# Patient Record
Sex: FEMALE | Race: WHITE | Employment: OTHER | URBAN - NONMETROPOLITAN AREA
[De-identification: names, ages, dates, MRNs, and addresses within clinical notes are randomized per-mention and may not be internally consistent; named-entity substitution may affect disease eponyms.]

---

## 2017-08-11 ENCOUNTER — HOSPITAL ENCOUNTER (EMERGENCY)
Age: 27
Discharge: HOME | End: 2017-08-11
Attending: EMERGENCY MEDICINE
Payer: COMMERCIAL

## 2017-08-11 VITALS
SYSTOLIC BLOOD PRESSURE: 119 MMHG | WEIGHT: 144 LBS | BODY MASS INDEX: 24.59 KG/M2 | HEART RATE: 84 BPM | OXYGEN SATURATION: 100 % | TEMPERATURE: 97.8 F | HEIGHT: 64 IN | RESPIRATION RATE: 16 BRPM | DIASTOLIC BLOOD PRESSURE: 69 MMHG

## 2017-08-11 DIAGNOSIS — R55 VASOVAGAL SYNCOPE: Primary | ICD-10-CM

## 2017-08-11 LAB
-: ABNORMAL
ABSOLUTE EOS #: 0.1 K/UL (ref 0–0.4)
ABSOLUTE LYMPH #: 1.5 K/UL (ref 1–4.8)
ABSOLUTE MONO #: 0.7 K/UL (ref 0–1)
ALBUMIN SERPL-MCNC: 4.4 G/DL (ref 3.5–5.2)
ALBUMIN/GLOBULIN RATIO: 1.7 (ref 1–2.5)
ALP BLD-CCNC: 77 U/L (ref 35–104)
ALT SERPL-CCNC: 17 U/L (ref 5–33)
AMORPHOUS: ABNORMAL
AMPHETAMINE SCREEN URINE: NEGATIVE
ANION GAP SERPL CALCULATED.3IONS-SCNC: 13 MMOL/L (ref 9–17)
AST SERPL-CCNC: 21 U/L
BACTERIA: ABNORMAL
BARBITURATE SCREEN URINE: NEGATIVE
BASOPHILS # BLD: 0 %
BASOPHILS ABSOLUTE: 0 K/UL (ref 0–0.2)
BENZODIAZEPINE SCREEN, URINE: NEGATIVE
BILIRUB SERPL-MCNC: 0.44 MG/DL (ref 0.3–1.2)
BILIRUBIN URINE: NEGATIVE
BUN BLDV-MCNC: 10 MG/DL (ref 6–20)
BUN/CREAT BLD: 14 (ref 9–20)
BUPRENORPHINE URINE: NEGATIVE
CALCIUM SERPL-MCNC: 9.2 MG/DL (ref 8.6–10.4)
CANNABINOID SCREEN URINE: NEGATIVE
CASTS UA: ABNORMAL /LPF
CHLORIDE BLD-SCNC: 102 MMOL/L (ref 98–107)
CO2: 25 MMOL/L (ref 20–31)
COCAINE METABOLITE, URINE: NEGATIVE
COLOR: YELLOW
COMMENT UA: ABNORMAL
CREAT SERPL-MCNC: 0.73 MG/DL (ref 0.5–0.9)
CRYSTALS, UA: ABNORMAL /HPF
DIFFERENTIAL TYPE: NORMAL
EOSINOPHILS RELATIVE PERCENT: 1 %
EPITHELIAL CELLS UA: ABNORMAL /HPF (ref 0–25)
GFR AFRICAN AMERICAN: >60 ML/MIN
GFR NON-AFRICAN AMERICAN: >60 ML/MIN
GFR SERPL CREATININE-BSD FRML MDRD: ABNORMAL ML/MIN/{1.73_M2}
GFR SERPL CREATININE-BSD FRML MDRD: ABNORMAL ML/MIN/{1.73_M2}
GLUCOSE BLD-MCNC: 121 MG/DL (ref 70–99)
GLUCOSE URINE: NEGATIVE
HCG QUALITATIVE: NEGATIVE
HCT VFR BLD CALC: 39.5 % (ref 36–46)
HEMOGLOBIN: 13.6 G/DL (ref 12–16)
KETONES, URINE: NEGATIVE
LEUKOCYTE ESTERASE, URINE: ABNORMAL
LYMPHOCYTES # BLD: 18 %
MCH RBC QN AUTO: 30.6 PG (ref 26–34)
MCHC RBC AUTO-ENTMCNC: 34.5 G/DL (ref 31–37)
MCV RBC AUTO: 88.7 FL (ref 80–100)
MDMA URINE: NORMAL
METHADONE SCREEN, URINE: NEGATIVE
METHAMPHETAMINE, URINE: NEGATIVE
MONOCYTES # BLD: 8 %
MUCUS: ABNORMAL
NITRITE, URINE: NEGATIVE
OPIATES, URINE: NEGATIVE
OTHER OBSERVATIONS UA: ABNORMAL
OXYCODONE SCREEN URINE: NEGATIVE
PDW BLD-RTO: 13.4 % (ref 12.1–15.2)
PH UA: 8 (ref 5–9)
PHENCYCLIDINE, URINE: NEGATIVE
PLATELET # BLD: 163 K/UL (ref 140–450)
PLATELET ESTIMATE: NORMAL
PMV BLD AUTO: 9.1 FL (ref 6–12)
POTASSIUM SERPL-SCNC: 3.6 MMOL/L (ref 3.7–5.3)
PROPOXYPHENE, URINE: NEGATIVE
PROTEIN UA: NEGATIVE
RBC # BLD: 4.45 M/UL (ref 4–5.2)
RBC # BLD: NORMAL 10*6/UL
RBC UA: ABNORMAL /HPF (ref 0–2)
RENAL EPITHELIAL, UA: ABNORMAL /HPF
SEG NEUTROPHILS: 73 %
SEGMENTED NEUTROPHILS ABSOLUTE COUNT: 6 K/UL (ref 1.8–7.7)
SODIUM BLD-SCNC: 140 MMOL/L (ref 135–144)
SPECIFIC GRAVITY UA: 1.01 (ref 1.01–1.02)
TEST INFORMATION: NORMAL
TOTAL PROTEIN: 7 G/DL (ref 6.4–8.3)
TRICHOMONAS: ABNORMAL
TRICYCLIC ANTIDEPRESSANTS, UR: NEGATIVE
TURBIDITY: CLEAR
URINE HGB: NEGATIVE
UROBILINOGEN, URINE: NORMAL
WBC # BLD: 8.3 K/UL (ref 3.5–11)
WBC # BLD: NORMAL 10*3/UL
WBC UA: ABNORMAL /HPF (ref 0–5)
YEAST: ABNORMAL

## 2017-08-11 PROCEDURE — 80306 DRUG TEST PRSMV INSTRMNT: CPT

## 2017-08-11 PROCEDURE — 99284 EMERGENCY DEPT VISIT MOD MDM: CPT

## 2017-08-11 PROCEDURE — 84703 CHORIONIC GONADOTROPIN ASSAY: CPT

## 2017-08-11 PROCEDURE — 80053 COMPREHEN METABOLIC PANEL: CPT

## 2017-08-11 PROCEDURE — 83520 IMMUNOASSAY QUANT NOS NONAB: CPT

## 2017-08-11 PROCEDURE — 81001 URINALYSIS AUTO W/SCOPE: CPT

## 2017-08-11 PROCEDURE — 36415 COLL VENOUS BLD VENIPUNCTURE: CPT

## 2017-08-11 PROCEDURE — 85025 COMPLETE CBC W/AUTO DIFF WBC: CPT

## 2017-08-11 RX ORDER — KETOROLAC TROMETHAMINE 10 MG/1
10 TABLET, FILM COATED ORAL EVERY 6 HOURS PRN
COMMUNITY
End: 2018-12-30 | Stop reason: ALTCHOICE

## 2017-08-11 RX ORDER — RIZATRIPTAN BENZOATE 10 MG/1
10 TABLET ORAL
COMMUNITY
End: 2018-12-30 | Stop reason: ALTCHOICE

## 2017-08-11 ASSESSMENT — ENCOUNTER SYMPTOMS
VOMITING: 0
COLOR CHANGE: 0
WHEEZING: 0
SHORTNESS OF BREATH: 0
DIARRHEA: 0
ABDOMINAL DISTENTION: 0
FACIAL SWELLING: 0
BACK PAIN: 0
TROUBLE SWALLOWING: 0
COUGH: 0
NAUSEA: 1
CONSTIPATION: 0
ABDOMINAL PAIN: 0

## 2017-08-14 LAB — TRYPTASE: 3.3 UG/L

## 2018-12-30 ENCOUNTER — OFFICE VISIT (OUTPATIENT)
Dept: FAMILY MEDICINE CLINIC | Age: 28
End: 2018-12-30
Payer: COMMERCIAL

## 2018-12-30 VITALS
BODY MASS INDEX: 27.29 KG/M2 | TEMPERATURE: 98.2 F | HEIGHT: 63 IN | DIASTOLIC BLOOD PRESSURE: 60 MMHG | WEIGHT: 154 LBS | HEART RATE: 64 BPM | RESPIRATION RATE: 16 BRPM | OXYGEN SATURATION: 98 % | SYSTOLIC BLOOD PRESSURE: 98 MMHG

## 2018-12-30 DIAGNOSIS — R68.89 FLU-LIKE SYMPTOMS: ICD-10-CM

## 2018-12-30 DIAGNOSIS — G43.719 INTRACTABLE CHRONIC MIGRAINE WITHOUT AURA AND WITHOUT STATUS MIGRAINOSUS: Primary | ICD-10-CM

## 2018-12-30 LAB
INFLUENZA A ANTIBODY: NEGATIVE
INFLUENZA B ANTIBODY: NEGATIVE

## 2018-12-30 PROCEDURE — 87804 INFLUENZA ASSAY W/OPTIC: CPT | Performed by: NURSE PRACTITIONER

## 2018-12-30 PROCEDURE — 99213 OFFICE O/P EST LOW 20 MIN: CPT | Performed by: NURSE PRACTITIONER

## 2018-12-30 RX ORDER — KETOROLAC TROMETHAMINE 30 MG/ML
60 INJECTION, SOLUTION INTRAMUSCULAR; INTRAVENOUS ONCE
Status: COMPLETED | OUTPATIENT
Start: 2018-12-30 | End: 2018-12-30

## 2018-12-30 RX ORDER — PROMETHAZINE HYDROCHLORIDE 50 MG/ML
25 INJECTION, SOLUTION INTRAMUSCULAR; INTRAVENOUS ONCE
Status: COMPLETED | OUTPATIENT
Start: 2018-12-30 | End: 2018-12-30

## 2018-12-30 RX ADMIN — PROMETHAZINE HYDROCHLORIDE 25 MG: 50 INJECTION, SOLUTION INTRAMUSCULAR; INTRAVENOUS at 14:25

## 2018-12-30 RX ADMIN — KETOROLAC TROMETHAMINE 60 MG: 30 INJECTION, SOLUTION INTRAMUSCULAR; INTRAVENOUS at 14:26

## 2018-12-30 ASSESSMENT — ENCOUNTER SYMPTOMS
VOMITING: 1
SINUS PRESSURE: 0
PHOTOPHOBIA: 1
VISUAL CHANGE: 0
BLURRED VISION: 0
SHORTNESS OF BREATH: 0
NAUSEA: 1
DIARRHEA: 0
BLOOD IN STOOL: 0
ABDOMINAL PAIN: 0

## 2018-12-30 ASSESSMENT — PATIENT HEALTH QUESTIONNAIRE - PHQ9
SUM OF ALL RESPONSES TO PHQ QUESTIONS 1-9: 0
SUM OF ALL RESPONSES TO PHQ QUESTIONS 1-9: 0
2. FEELING DOWN, DEPRESSED OR HOPELESS: 0
SUM OF ALL RESPONSES TO PHQ9 QUESTIONS 1 & 2: 0
1. LITTLE INTEREST OR PLEASURE IN DOING THINGS: 0

## 2018-12-30 NOTE — LETTER
[address], [address]  [facility] [address]  Phone: [phone]  Fax: [address] - CNP        December 30, 2018     Patient: Shaheen Lazo   YOB: 1990   Date of Visit: 12/30/2018       To Whom it May Concern:    Trini Hyatt was seen in my clinic on 12/30/2018. Please excuse her for 12/29 and 12/30. She may return to work on 12/31/18. If you have any questions or concerns, please don't hesitate to call.     Sincerely,         TRACI Burgos CNP

## 2018-12-30 NOTE — LETTER
[address]  [facility] [address]  [facility] [place] [address]  Phone: [phone]  Fax: [address], TRACI - CNP        December 30, 2018     Patient: Paul Donis   YOB: 1990   Date of Visit: 12/30/2018       To Whom it May Concern:    Kenisha Negron was seen in my clinic on 12/30/2018. Please excuse her for 12/29/18-1/1/19. She may return to work on 01/01/18. If you have any questions or concerns, please don't hesitate to call.     Sincerely,         TRACI Smith CNP

## 2019-02-27 ENCOUNTER — OFFICE VISIT (OUTPATIENT)
Dept: FAMILY MEDICINE CLINIC | Age: 29
End: 2019-02-27
Payer: MEDICAID

## 2019-02-27 VITALS
HEART RATE: 97 BPM | DIASTOLIC BLOOD PRESSURE: 70 MMHG | OXYGEN SATURATION: 98 % | BODY MASS INDEX: 27.46 KG/M2 | HEIGHT: 63 IN | WEIGHT: 155 LBS | TEMPERATURE: 97.8 F | SYSTOLIC BLOOD PRESSURE: 112 MMHG

## 2019-02-27 DIAGNOSIS — G43.119 INTRACTABLE MIGRAINE WITH AURA WITHOUT STATUS MIGRAINOSUS: Primary | ICD-10-CM

## 2019-02-27 PROCEDURE — 99213 OFFICE O/P EST LOW 20 MIN: CPT | Performed by: NURSE PRACTITIONER

## 2019-02-27 RX ORDER — PROMETHAZINE HYDROCHLORIDE 25 MG/ML
6.25 INJECTION, SOLUTION INTRAMUSCULAR; INTRAVENOUS ONCE
Status: COMPLETED | OUTPATIENT
Start: 2019-02-27 | End: 2019-02-27

## 2019-02-27 RX ORDER — KETOROLAC TROMETHAMINE 30 MG/ML
60 INJECTION, SOLUTION INTRAMUSCULAR; INTRAVENOUS ONCE
Status: COMPLETED | OUTPATIENT
Start: 2019-02-27 | End: 2019-02-27

## 2019-02-27 RX ADMIN — KETOROLAC TROMETHAMINE 60 MG: 30 INJECTION, SOLUTION INTRAMUSCULAR; INTRAVENOUS at 19:12

## 2019-02-27 RX ADMIN — PROMETHAZINE HYDROCHLORIDE 6.25 MG: 25 INJECTION, SOLUTION INTRAMUSCULAR; INTRAVENOUS at 19:01

## 2019-02-27 ASSESSMENT — PATIENT HEALTH QUESTIONNAIRE - PHQ9
1. LITTLE INTEREST OR PLEASURE IN DOING THINGS: 0
2. FEELING DOWN, DEPRESSED OR HOPELESS: 0
SUM OF ALL RESPONSES TO PHQ9 QUESTIONS 1 & 2: 0
SUM OF ALL RESPONSES TO PHQ QUESTIONS 1-9: 0
SUM OF ALL RESPONSES TO PHQ QUESTIONS 1-9: 0

## 2019-02-27 ASSESSMENT — ENCOUNTER SYMPTOMS
COUGH: 0
BLURRED VISION: 1
DIARRHEA: 0
SHORTNESS OF BREATH: 0
PHOTOPHOBIA: 1
ABDOMINAL PAIN: 0
SINUS PAIN: 0
WHEEZING: 0
NAUSEA: 1
VOMITING: 0
SORE THROAT: 0

## 2021-01-15 ENCOUNTER — APPOINTMENT (OUTPATIENT)
Dept: GENERAL RADIOLOGY | Age: 31
End: 2021-01-15
Payer: COMMERCIAL

## 2021-01-15 ENCOUNTER — HOSPITAL ENCOUNTER (EMERGENCY)
Age: 31
Discharge: HOME | End: 2021-01-15
Payer: COMMERCIAL

## 2021-01-15 ENCOUNTER — APPOINTMENT (OUTPATIENT)
Dept: CT IMAGING | Age: 31
End: 2021-01-15
Payer: COMMERCIAL

## 2021-01-15 VITALS
DIASTOLIC BLOOD PRESSURE: 66 MMHG | RESPIRATION RATE: 18 BRPM | HEART RATE: 88 BPM | BODY MASS INDEX: 28.34 KG/M2 | OXYGEN SATURATION: 99 % | WEIGHT: 160 LBS | SYSTOLIC BLOOD PRESSURE: 105 MMHG | TEMPERATURE: 98 F

## 2021-01-15 DIAGNOSIS — R42 VERTIGO: Primary | ICD-10-CM

## 2021-01-15 LAB
ABSOLUTE EOS #: 0.05 K/UL (ref 0–0.44)
ABSOLUTE IMMATURE GRANULOCYTE: <0.03 K/UL (ref 0–0.3)
ABSOLUTE LYMPH #: 1.18 K/UL (ref 1.1–3.7)
ABSOLUTE MONO #: 0.39 K/UL (ref 0.1–1.2)
ANION GAP SERPL CALCULATED.3IONS-SCNC: 9 MMOL/L (ref 9–17)
BASOPHILS # BLD: 0 % (ref 0–2)
BASOPHILS ABSOLUTE: <0.03 K/UL (ref 0–0.2)
BUN BLDV-MCNC: 12 MG/DL (ref 6–20)
BUN/CREAT BLD: 17 (ref 9–20)
CALCIUM SERPL-MCNC: 9.4 MG/DL (ref 8.6–10.4)
CHLORIDE BLD-SCNC: 104 MMOL/L (ref 98–107)
CO2: 26 MMOL/L (ref 20–31)
CREAT SERPL-MCNC: 0.7 MG/DL (ref 0.5–0.9)
DIFFERENTIAL TYPE: ABNORMAL
EOSINOPHILS RELATIVE PERCENT: 1 % (ref 1–4)
GFR AFRICAN AMERICAN: >60 ML/MIN
GFR NON-AFRICAN AMERICAN: >60 ML/MIN
GFR SERPL CREATININE-BSD FRML MDRD: NORMAL ML/MIN/{1.73_M2}
GFR SERPL CREATININE-BSD FRML MDRD: NORMAL ML/MIN/{1.73_M2}
GLUCOSE BLD-MCNC: 81 MG/DL (ref 70–99)
HCT VFR BLD CALC: 38.8 % (ref 36.3–47.1)
HEMOGLOBIN: 13.3 G/DL (ref 11.9–15.1)
IMMATURE GRANULOCYTES: 0 %
LYMPHOCYTES # BLD: 25 % (ref 24–43)
MCH RBC QN AUTO: 31.1 PG (ref 25.2–33.5)
MCHC RBC AUTO-ENTMCNC: 34.3 G/DL (ref 28.4–34.8)
MCV RBC AUTO: 90.9 FL (ref 82.6–102.9)
MONOCYTES # BLD: 8 % (ref 3–12)
NRBC AUTOMATED: 0 PER 100 WBC
PDW BLD-RTO: 11.6 % (ref 11.8–14.4)
PLATELET # BLD: 170 K/UL (ref 138–453)
PLATELET ESTIMATE: ABNORMAL
PMV BLD AUTO: 11 FL (ref 8.1–13.5)
POTASSIUM SERPL-SCNC: 3.9 MMOL/L (ref 3.7–5.3)
RBC # BLD: 4.27 M/UL (ref 3.95–5.11)
RBC # BLD: ABNORMAL 10*6/UL
SEG NEUTROPHILS: 66 % (ref 36–65)
SEGMENTED NEUTROPHILS ABSOLUTE COUNT: 3.09 K/UL (ref 1.5–8.1)
SODIUM BLD-SCNC: 139 MMOL/L (ref 135–144)
WBC # BLD: 4.7 K/UL (ref 3.5–11.3)
WBC # BLD: ABNORMAL 10*3/UL

## 2021-01-15 PROCEDURE — 6370000000 HC RX 637 (ALT 250 FOR IP): Performed by: NURSE PRACTITIONER

## 2021-01-15 PROCEDURE — 71045 X-RAY EXAM CHEST 1 VIEW: CPT

## 2021-01-15 PROCEDURE — 2580000003 HC RX 258: Performed by: NURSE PRACTITIONER

## 2021-01-15 PROCEDURE — 85025 COMPLETE CBC W/AUTO DIFF WBC: CPT

## 2021-01-15 PROCEDURE — 96374 THER/PROPH/DIAG INJ IV PUSH: CPT

## 2021-01-15 PROCEDURE — 80048 BASIC METABOLIC PNL TOTAL CA: CPT

## 2021-01-15 PROCEDURE — 96375 TX/PRO/DX INJ NEW DRUG ADDON: CPT

## 2021-01-15 PROCEDURE — 70450 CT HEAD/BRAIN W/O DYE: CPT

## 2021-01-15 PROCEDURE — 36415 COLL VENOUS BLD VENIPUNCTURE: CPT

## 2021-01-15 PROCEDURE — 6360000002 HC RX W HCPCS: Performed by: NURSE PRACTITIONER

## 2021-01-15 PROCEDURE — 93005 ELECTROCARDIOGRAM TRACING: CPT | Performed by: FAMILY MEDICINE

## 2021-01-15 PROCEDURE — 99284 EMERGENCY DEPT VISIT MOD MDM: CPT

## 2021-01-15 RX ORDER — METOCLOPRAMIDE HYDROCHLORIDE 5 MG/ML
10 INJECTION INTRAMUSCULAR; INTRAVENOUS ONCE
Status: COMPLETED | OUTPATIENT
Start: 2021-01-15 | End: 2021-01-15

## 2021-01-15 RX ORDER — DIPHENHYDRAMINE HYDROCHLORIDE 50 MG/ML
25 INJECTION INTRAMUSCULAR; INTRAVENOUS ONCE
Status: COMPLETED | OUTPATIENT
Start: 2021-01-15 | End: 2021-01-15

## 2021-01-15 RX ORDER — ELETRIPTAN HYDROBROMIDE 40 MG/1
40 TABLET, FILM COATED ORAL
COMMUNITY
End: 2021-09-01

## 2021-01-15 RX ORDER — MECLIZINE HCL 12.5 MG/1
50 TABLET ORAL ONCE
Status: COMPLETED | OUTPATIENT
Start: 2021-01-15 | End: 2021-01-15

## 2021-01-15 RX ORDER — 0.9 % SODIUM CHLORIDE 0.9 %
1000 INTRAVENOUS SOLUTION INTRAVENOUS ONCE
Status: COMPLETED | OUTPATIENT
Start: 2021-01-15 | End: 2021-01-15

## 2021-01-15 RX ORDER — MECLIZINE HYDROCHLORIDE 25 MG/1
25 TABLET ORAL 3 TIMES DAILY PRN
Qty: 30 TABLET | Refills: 0 | Status: SHIPPED | OUTPATIENT
Start: 2021-01-15 | End: 2021-01-25

## 2021-01-15 RX ADMIN — MECLIZINE 50 MG: 12.5 TABLET ORAL at 17:14

## 2021-01-15 RX ADMIN — DIPHENHYDRAMINE HYDROCHLORIDE 25 MG: 50 INJECTION, SOLUTION INTRAMUSCULAR; INTRAVENOUS at 17:16

## 2021-01-15 RX ADMIN — SODIUM CHLORIDE 1000 ML: 9 INJECTION, SOLUTION INTRAVENOUS at 17:14

## 2021-01-15 RX ADMIN — METOCLOPRAMIDE 10 MG: 5 INJECTION, SOLUTION INTRAMUSCULAR; INTRAVENOUS at 17:19

## 2021-01-15 ASSESSMENT — PAIN SCALES - GENERAL
PAINLEVEL_OUTOF10: 9
PAINLEVEL_OUTOF10: 6
PAINLEVEL_OUTOF10: 4

## 2021-01-15 ASSESSMENT — PAIN DESCRIPTION - DESCRIPTORS: DESCRIPTORS: ACHING

## 2021-01-15 ASSESSMENT — PAIN - FUNCTIONAL ASSESSMENT: PAIN_FUNCTIONAL_ASSESSMENT: 0-10

## 2021-01-15 ASSESSMENT — PAIN DESCRIPTION - LOCATION: LOCATION: HEAD

## 2021-01-15 NOTE — ED PROVIDER NOTES
[address] [facility] ED  EMERGENCY DEPARTMENT ENCOUNTER      Pt Name: Moe Wren  MRN: [id number]  Birthdate 1990  Date of evaluation: 1/15/2021  Provider: Jimmy Hughes       Chief Complaint   Patient presents with    Dizziness     pt states onset today    Migraine         HISTORY OF PRESENT ILLNESS   (Location/Symptom, Timing/Onset, Context/Setting, Quality, Duration, Modifying Factors, Severity)  Note limiting factors. Moe Wren is a 31 y.o. female who presents to the emergency department with complaint of dizziness. The patient states that it feels as if the room is spinning or that they are on a boat. The symptoms started, today suddenly. The patient denies any chest pain, shortness of breath, fevers or chills. The symptoms are not relieved by any patient interventions except for lying still. Patient does also reports she has a migraine headache feels like her normal migraine headaches. She states it is not the worst headache of her life. She states it was a gradual onset headache. She rates the pain a 6 out of 10 on the pain scale. Nursing Notes were reviewed. REVIEW OF SYSTEMS    (2-9 systems for level 4, 10 or more for level 5)   Constitutional: Negative for fever, chills,  no fatigue. Skin: Negative for rash, itching  HENT: Negative for hearing loss, ear pain  Eyes: Negative for blurred vision  Cardiovascular: Negative for chest pain  Respiratory: Negative for cough. No shortness of breath, wheezing or stridor. Gastrointestinal: Negative for heartburn, nausea  Musculoskeletal: Negative for myalgias, back pain, joint pain and falls. Neurological: see HPI  Allergy/Immunology: Negative for environmental allergies and urticaria. PAST MEDICAL HISTORY     Past Medical History:   Diagnosis Date    Fibromyalgia     Headache          SURGICAL HISTORY     History reviewed. No pertinent surgical history.       CURRENT MEDICATIONS Previous Medications    ACYCLOVIR 50 MG TABS    Place inside cheek    ELETRIPTAN (RELPAX) 40 MG TABLET    Take 40 mg by mouth once as needed may repeat in 2 hours if necessary    LEVONORGESTREL (MIRENA, 52 MG,) IUD 52 MG    1 each by Intrauterine route once    MEDICAL MARIJUANA    Take 1 each by mouth as needed. ALLERGIES     Peanut-containing drug products    FAMILY HISTORY     History reviewed. No pertinent family history.        SOCIAL HISTORY       Social History     Socioeconomic History    Marital status:      Spouse name: None    Number of children: None    Years of education: None    Highest education level: None   Occupational History    None   Social Needs    Financial resource strain: None    Food insecurity     Worry: None     Inability: None    Transportation needs     Medical: None     Non-medical: None   Tobacco Use    Smoking status: Never Smoker    Smokeless tobacco: Never Used   Substance and Sexual Activity    Alcohol use: No    Drug use: No    Sexual activity: None   Lifestyle    Physical activity     Days per week: None     Minutes per session: None    Stress: None   Relationships    Social connections     Talks on phone: None     Gets together: None     Attends Pentecostalism service: None     Active member of club or organization: None     Attends meetings of clubs or organizations: None     Relationship status: None    Intimate partner violence     Fear of current or ex partner: None     Emotionally abused: None     Physically abused: None     Forced sexual activity: None   Other Topics Concern    None   Social History Narrative    None       SCREENINGS                PHYSICAL EXAM    (up to 7 for level 4, 8 or more for level 5)     ED Triage Vitals [01/15/21 1639]   BP Temp Temp Source Pulse Resp SpO2 Height Weight   109/68 98 °F (36.7 °C) Tympanic 88 18 99 % -- 160 lb (72.6 kg)       Constitutional: Oriented and well-developed, well-nourished, and in no distress. Non-toxic appearance. HEENT:   Head: Normocephalic and atraumatic. Eyes: Extra ocular muscles intact. . Pupils are equal, round, and reactive to light. No discharge. No scleral icterus. Slight horizontal nystagmus is noted. Neck: Normal range of motion and phonation normal. Neck supple. No JVD present. No spinous process tenderness and no muscular tenderness present. No thyromegaly present. No cervical adenopathy. Cardiovascular: Regular rate and rhythm, normal heart sounds and intact distal pulses. No murmur heard. Pulmonary/Chest: Effort normal and breath sounds normal. No accessory muscle usage or stridor. Not tachypneic. No respiratory distress. No tenderness and no retraction. Abdominal: Soft and non-distended. Bowel sounds are normal. No masses or organomegaly. No significant tenderness. No rebound, no guarding and no CVA tenderness. No appreciable hernia. Musculoskeletal: Normal range of motion. No edema and no tenderness. Neurological: Alert and oriented. Normal sensation, normal strength, normal reflexes and intact cranial nerves II-XII. No weakness, tremor, or  facial asymmetry, normal speech and normal reflexes. No cranial nerve deficit. Normal muscle tone. Bilateral heel to knee exam is normal. No focal deficit. Finger to nose test is intact. Able to ambulate. Skin: Skin is warm and dry. No rash noted. No cyanosis or erythema. No pallor. Nails show no clubbing.      DIAGNOSTIC RESULTS     EKG: All EKG's are interpreted by the Emergency Department Physician who either signs or Co-signs this chart in the absence of a cardiologist.      RADIOLOGY:   Non-plain film images such as CT, Ultrasound and MRI are read by the radiologist. Plain radiographic images are visualized and preliminarily interpreted by the emergency physician with the below findings:    Interpretation per the Radiologist below, if available at the time of this note:    CT Head WO Contrast   Final Result   No acute intracranial abnormality. XR CHEST PORTABLE   Final Result   No acute cardiopulmonary process.                LABS:  Results for orders placed or performed during the hospital encounter of 01/15/21   CBC Auto Differential   Result Value Ref Range    WBC 4.7 3.5 - 11.3 k/uL    RBC 4.27 3.95 - 5.11 m/uL    Hemoglobin 13.3 11.9 - 15.1 g/dL    Hematocrit 38.8 36.3 - 47.1 %    MCV 90.9 82.6 - 102.9 fL    MCH 31.1 25.2 - 33.5 pg    MCHC 34.3 28.4 - 34.8 g/dL    RDW 11.6 (L) 11.8 - 14.4 %    Platelets 570 957 - 349 k/uL    MPV 11.0 8.1 - 13.5 fL    NRBC Automated 0.0 0.0 per 100 WBC    Differential Type NOT REPORTED     Seg Neutrophils 66 (H) 36 - 65 %    Lymphocytes 25 24 - 43 %    Monocytes 8 3 - 12 %    Eosinophils % 1 1 - 4 %    Basophils 0 0 - 2 %    Immature Granulocytes 0 0 %    Segs Absolute 3.09 1.50 - 8.10 k/uL    Absolute Lymph # 1.18 1.10 - 3.70 k/uL    Absolute Mono # 0.39 0.10 - 1.20 k/uL    Absolute Eos # 0.05 0.00 - 0.44 k/uL    Basophils Absolute <0.03 0.00 - 0.20 k/uL    Absolute Immature Granulocyte <0.03 0.00 - 0.30 k/uL    WBC Morphology NOT REPORTED     RBC Morphology NOT REPORTED     Platelet Estimate NOT REPORTED    Basic Metabolic Panel w/ Reflex to MG   Result Value Ref Range    Glucose 81 70 - 99 mg/dL    BUN 12 6 - 20 mg/dL    CREATININE 0.70 0.50 - 0.90 mg/dL    Bun/Cre Ratio 17 9 - 20    Calcium 9.4 8.6 - 10.4 mg/dL    Sodium 139 135 - 144 mmol/L    Potassium 3.9 3.7 - 5.3 mmol/L    Chloride 104 98 - 107 mmol/L    CO2 26 20 - 31 mmol/L    Anion Gap 9 9 - 17 mmol/L    GFR Non-African American >60 >60 mL/min    GFR African American >60 >60 mL/min    GFR Comment          GFR Staging         EKG 12 Lead   Result Value Ref Range    Ventricular Rate 78 BPM    Atrial Rate 78 BPM    P-R Interval 142 ms    QRS Duration 84 ms    Q-T Interval 378 ms    QTc Calculation (Bazett) 430 ms    P Axis 40 degrees    R Axis 58 degrees    T Axis 55 degrees     Orders Placed This Encounter   Procedures    CT Head WO Contrast    XR CHEST PORTABLE    CBC Auto Differential    Basic Metabolic Panel w/ Reflex to MG    EKG 12 Lead    Insert peripheral IV       All other labs were within normal range or not returned as of this dictation. EMERGENCY DEPARTMENT COURSE and DIFFERENTIAL DIAGNOSIS/MDM:   Vitals:    Vitals:    01/15/21 1718 01/15/21 1730 01/15/21 1752 01/15/21 1800   BP: (!) 121/93 105/63 107/60 102/61   Pulse:       Resp:       Temp:       TempSrc:       SpO2:    99%   Weight:           MEDICAL DECISION MAKING:  Patient reports that her dizziness and her headache is significantly better. She will be discharged in stable condition. She was directed to follow-up with her primary care provider. The patient was evaluated during the global COVID-19 pandemic, and that diagnosis was considered upon their initial presentation. Their evaluation, treatment and testing was consistent with current guidelines for patients who present with complaints or symptoms that may be related to COVID-19. Full PPE including gloves, gown, N95 or P100 respirator, and eye protection was used during every encounter with this patient. FINAL IMPRESSION      1. Vertigo Stable         DISPOSITION/PLAN   DISPOSITION Decision To Discharge 01/15/2021 06:03:51 PM      PATIENT REFERRED TO:  Shayy Hammond  [address] [place] [address]  [phone]    Schedule an appointment as soon as possible for a visit in 3 days        DISCHARGE MEDICATIONS:  New Prescriptions    MECLIZINE (ANTIVERT) 25 MG TABLET    Take 1 tablet by mouth 3 times daily as needed for Dizziness     Controlled Substances Monitoring:     No flowsheet data found.     (Please note that portions of this note were completed with a voice recognition program.  Efforts were made to edit the dictations but occasionally words are mis-transcribed.)    Electronically signed by TRACI Vasquez CNP on 1/15/2021 at 6:09 PM               TRACI Vasquez - CNP  01/15/21 1815

## 2021-01-17 LAB
EKG ATRIAL RATE: 78 BPM
EKG P AXIS: 40 DEGREES
EKG P-R INTERVAL: 142 MS
EKG Q-T INTERVAL: 378 MS
EKG QRS DURATION: 84 MS
EKG QTC CALCULATION (BAZETT): 430 MS
EKG R AXIS: 58 DEGREES
EKG T AXIS: 55 DEGREES
EKG VENTRICULAR RATE: 78 BPM

## 2021-01-17 PROCEDURE — 93010 ELECTROCARDIOGRAM REPORT: CPT | Performed by: INTERNAL MEDICINE

## 2021-02-28 ENCOUNTER — HOSPITAL ENCOUNTER (EMERGENCY)
Age: 31
Discharge: HOME | End: 2021-02-28
Attending: EMERGENCY MEDICINE
Payer: COMMERCIAL

## 2021-02-28 VITALS
TEMPERATURE: 97.6 F | HEART RATE: 81 BPM | DIASTOLIC BLOOD PRESSURE: 95 MMHG | OXYGEN SATURATION: 98 % | RESPIRATION RATE: 18 BRPM | SYSTOLIC BLOOD PRESSURE: 143 MMHG

## 2021-02-28 DIAGNOSIS — G43.809 OTHER MIGRAINE WITHOUT STATUS MIGRAINOSUS, NOT INTRACTABLE: Primary | ICD-10-CM

## 2021-02-28 LAB
SARS-COV-2, RAPID: NOT DETECTED
SPECIMEN DESCRIPTION: NORMAL

## 2021-02-28 PROCEDURE — 99283 EMERGENCY DEPT VISIT LOW MDM: CPT

## 2021-02-28 PROCEDURE — 6360000002 HC RX W HCPCS: Performed by: EMERGENCY MEDICINE

## 2021-02-28 PROCEDURE — C9803 HOPD COVID-19 SPEC COLLECT: HCPCS

## 2021-02-28 PROCEDURE — 96375 TX/PRO/DX INJ NEW DRUG ADDON: CPT

## 2021-02-28 PROCEDURE — U0002 COVID-19 LAB TEST NON-CDC: HCPCS

## 2021-02-28 PROCEDURE — 96374 THER/PROPH/DIAG INJ IV PUSH: CPT

## 2021-02-28 RX ORDER — DIPHENHYDRAMINE HYDROCHLORIDE 50 MG/ML
25 INJECTION INTRAMUSCULAR; INTRAVENOUS ONCE
Status: COMPLETED | OUTPATIENT
Start: 2021-02-28 | End: 2021-02-28

## 2021-02-28 RX ORDER — PROCHLORPERAZINE EDISYLATE 5 MG/ML
10 INJECTION INTRAMUSCULAR; INTRAVENOUS ONCE
Status: COMPLETED | OUTPATIENT
Start: 2021-02-28 | End: 2021-02-28

## 2021-02-28 RX ORDER — DEXAMETHASONE SODIUM PHOSPHATE 4 MG/ML
4 INJECTION, SOLUTION INTRA-ARTICULAR; INTRALESIONAL; INTRAMUSCULAR; INTRAVENOUS; SOFT TISSUE ONCE
Status: COMPLETED | OUTPATIENT
Start: 2021-02-28 | End: 2021-02-28

## 2021-02-28 RX ADMIN — PROCHLORPERAZINE EDISYLATE 10 MG: 5 INJECTION INTRAMUSCULAR; INTRAVENOUS at 22:28

## 2021-02-28 RX ADMIN — DEXAMETHASONE SODIUM PHOSPHATE 4 MG: 4 INJECTION, SOLUTION INTRA-ARTICULAR; INTRALESIONAL; INTRAMUSCULAR; INTRAVENOUS; SOFT TISSUE at 22:28

## 2021-02-28 RX ADMIN — DIPHENHYDRAMINE HYDROCHLORIDE 25 MG: 50 INJECTION, SOLUTION INTRAMUSCULAR; INTRAVENOUS at 22:28

## 2021-02-28 ASSESSMENT — PAIN DESCRIPTION - PAIN TYPE: TYPE: ACUTE PAIN

## 2021-02-28 ASSESSMENT — PAIN SCALES - GENERAL: PAINLEVEL_OUTOF10: 10

## 2021-03-01 ASSESSMENT — ENCOUNTER SYMPTOMS
PHOTOPHOBIA: 1
VOMITING: 1
SORE THROAT: 0
SHORTNESS OF BREATH: 0
NAUSEA: 1
ABDOMINAL DISTENTION: 0
WHEEZING: 0
COUGH: 0
DIARRHEA: 0
CONSTIPATION: 0
RHINORRHEA: 0

## 2021-03-01 NOTE — ED PROVIDER NOTES
[facility] ED  Emergency Department        Pt Name: Warren Wren  MRN: [id number]  Birthdate 1990  Date of evaluation: 2/28/21    CHIEF COMPLAINT       Chief Complaint   Patient presents with    Migraine     ttok home medication but not working    Sinusitis     wants covid testing       HISTORY OF PRESENT ILLNESS  (Location/Symptom, Timing/Onset, Context/Setting, Quality, Duration, ModifyingFactors, Severity.)      Cirilokemaynor Wren is a 31 y.o. female who presents with headache ongoing for the past 2 days, felt it start in her \"sinuses\", and pain has continued, she took norteq earlier today. Has a h/o migraine since she was 27. Took toradol, sumatriptan earlier without relief  Continues to feel pressure in her sinuses and face, and in her neck. She reports this headache is slightly different in that her pain is more intense. She report sensitivity to light, and also vomiting. Follows with neurologist zofia hernadez from [place]. No numbness, no tingling,   Patient reports not being able to smell tonight. No fevers,   Slight sore throat, and post nasal drip. Also having body aches. PAST MEDICAL / SURGICAL / SOCIAL / FAMILY HISTORY      has a past medical history of Fibromyalgia and Headache.     has no past surgical history on file.        Social History     Socioeconomic History    Marital status:      Spouse name: Not on file    Number of children: Not on file    Years of education: Not on file    Highest education level: Not on file   Occupational History    Not on file   Social Needs    Financial resource strain: Not on file    Food insecurity     Worry: Not on file     Inability: Not on file    Transportation needs     Medical: Not on file     Non-medical: Not on file   Tobacco Use    Smoking status: Never Smoker    Smokeless tobacco: Never Used   Substance and Sexual Activity    Alcohol use: No    Drug use: No    Sexual activity: Not on file   Lifestyle    Physical activity     Days per week: Not on file     Minutes per session: Not on file    Stress: Not on file   Relationships    Social connections     Talks on phone: Not on file     Gets together: Not on file     Attends Christian service: Not on file     Active member of club or organization: Not on file     Attends meetings of clubs or organizations: Not on file     Relationship status: Not on file    Intimate partner violence     Fear of current or ex partner: Not on file     Emotionally abused: Not on file     Physically abused: Not on file     Forced sexual activity: Not on file   Other Topics Concern    Not on file   Social History Narrative    Not on file       History reviewed. No pertinent family history. Allergies:  Peanut-containing drug products    Home Medications:  Prior to Admission medications    Medication Sig Start Date End Date Taking? Authorizing Provider   eletriptan (RELPAX) 40 MG tablet Take 40 mg by mouth once as needed may repeat in 2 hours if necessary   Yes Historical Provider, MD   Acyclovir 50 MG TABS Place inside cheek   Yes Historical Provider, MD   levonorgestrel (MIRENA, 52 MG,) IUD 52 mg 1 each by Intrauterine route once   Yes Historical Provider, MD   medical marijuana Take 1 each by mouth as needed. Historical Provider, MD       REVIEW OF SYSTEMS    (2-9 systems for level 4, 10 or more for level 5)      Review of Systems   Constitutional: Negative for activity change, appetite change, fatigue and fever. HENT: Negative for congestion, rhinorrhea and sore throat. Eyes: Positive for photophobia. Respiratory: Negative for cough, shortness of breath and wheezing. Cardiovascular: Negative for chest pain, palpitations and leg swelling. Gastrointestinal: Positive for nausea and vomiting. Negative for abdominal distention, constipation and diarrhea. Genitourinary: Negative for decreased urine volume and dysuria. Skin: Negative for rash.    Neurological: Positive  COVID-19, Rapid    Insert peripheral IV       MEDICATIONS ORDERED:  Orders Placed This Encounter   Medications    prochlorperazine (COMPAZINE) injection 10 mg    diphenhydrAMINE (BENADRYL) injection 25 mg    dexamethasone (DECADRON) injection 4 mg       DIAGNOSTIC RESULTS / EMERGENCY DEPARTMENT COURSE / MDM     LABS:  Results for orders placed or performed during the hospital encounter of 02/28/21   COVID-19, Rapid    Specimen: Nasopharyngeal Swab   Result Value Ref Range    Specimen Description . NASOPHARYNGEAL SWAB     SARS-CoV-2, Rapid Not Detected Not Detected       IMPRESSION: migraine    RADIOLOGY:      EKG:      POC ULTRASOUND:      EMERGENCY DEPARTMENT COURSE:  Patient reassessed and pain has improved, plan for discharge home, covid negative. PROCEDURES:      CONSULTS:  None    CRITICAL CARE:      FINAL IMPRESSION      1.  Other migraine without status migrainosus, not intractable          DISPOSITION / PLAN     DISPOSITION Decision To Discharge 02/28/2021 10:56:24 PM      PATIENT REFERRED TO:  [facility] ED  [address] [place] [address]    If symptoms worsen      DISCHARGE MEDICATIONS:  Discharge Medication List as of 2/28/2021 11:01 PM          Taz Rodriguez  1:56 AM    Attending Emergency Physician  [address] [place] [address] ED    (Please note that portions of this note were completed with a voice recognition program.  Effortswere made to edit the dictations but occasionally words are mis-transcribed.)              Jv Lazo DO  03/01/21 6435

## 2021-04-03 ENCOUNTER — HOSPITAL ENCOUNTER (EMERGENCY)
Age: 31
Discharge: HOME | End: 2021-04-03
Attending: EMERGENCY MEDICINE
Payer: COMMERCIAL

## 2021-04-03 VITALS
RESPIRATION RATE: 16 BRPM | SYSTOLIC BLOOD PRESSURE: 122 MMHG | TEMPERATURE: 97.3 F | DIASTOLIC BLOOD PRESSURE: 77 MMHG | HEART RATE: 81 BPM | OXYGEN SATURATION: 95 %

## 2021-04-03 DIAGNOSIS — G43.809 OTHER MIGRAINE WITHOUT STATUS MIGRAINOSUS, NOT INTRACTABLE: Primary | ICD-10-CM

## 2021-04-03 PROCEDURE — 99282 EMERGENCY DEPT VISIT SF MDM: CPT

## 2021-04-03 PROCEDURE — 6360000002 HC RX W HCPCS: Performed by: EMERGENCY MEDICINE

## 2021-04-03 PROCEDURE — 96375 TX/PRO/DX INJ NEW DRUG ADDON: CPT

## 2021-04-03 PROCEDURE — 96374 THER/PROPH/DIAG INJ IV PUSH: CPT

## 2021-04-03 PROCEDURE — 2580000003 HC RX 258: Performed by: EMERGENCY MEDICINE

## 2021-04-03 RX ORDER — 0.9 % SODIUM CHLORIDE 0.9 %
1000 INTRAVENOUS SOLUTION INTRAVENOUS ONCE
Status: COMPLETED | OUTPATIENT
Start: 2021-04-03 | End: 2021-04-03

## 2021-04-03 RX ORDER — DIPHENHYDRAMINE HYDROCHLORIDE 50 MG/ML
25 INJECTION INTRAMUSCULAR; INTRAVENOUS ONCE
Status: COMPLETED | OUTPATIENT
Start: 2021-04-03 | End: 2021-04-03

## 2021-04-03 RX ORDER — METOCLOPRAMIDE HYDROCHLORIDE 5 MG/ML
10 INJECTION INTRAMUSCULAR; INTRAVENOUS ONCE
Status: COMPLETED | OUTPATIENT
Start: 2021-04-03 | End: 2021-04-03

## 2021-04-03 RX ORDER — KETOROLAC TROMETHAMINE 15 MG/ML
15 INJECTION, SOLUTION INTRAMUSCULAR; INTRAVENOUS ONCE
Status: COMPLETED | OUTPATIENT
Start: 2021-04-03 | End: 2021-04-03

## 2021-04-03 RX ADMIN — SODIUM CHLORIDE 1000 ML: 9 INJECTION, SOLUTION INTRAVENOUS at 21:47

## 2021-04-03 RX ADMIN — KETOROLAC TROMETHAMINE 15 MG: 15 INJECTION, SOLUTION INTRAMUSCULAR; INTRAVENOUS at 21:46

## 2021-04-03 RX ADMIN — METOCLOPRAMIDE 10 MG: 5 INJECTION, SOLUTION INTRAMUSCULAR; INTRAVENOUS at 21:46

## 2021-04-03 RX ADMIN — DIPHENHYDRAMINE HYDROCHLORIDE 25 MG: 50 INJECTION, SOLUTION INTRAMUSCULAR; INTRAVENOUS at 21:46

## 2021-04-04 ASSESSMENT — ENCOUNTER SYMPTOMS
COLOR CHANGE: 0
ABDOMINAL PAIN: 0
NAUSEA: 0
WHEEZING: 0
SHORTNESS OF BREATH: 0
VOMITING: 0
RHINORRHEA: 0

## 2021-04-04 NOTE — ED PROVIDER NOTES
[facility] ED  Emergency Department Encounter  EmergencyMedicine Attending     Pt Lindsey Wren  MRN: [id number]  Birthdate 1990  Date of evaluation: 4/3/21  PCP:  Debbie Townsend    CHIEF COMPLAINT       Chief Complaint   Patient presents with    Headache     onset today, history of migraines       HISTORY OF PRESENT ILLNESS  (Location/Symptom, Timing/Onset, Context/Setting, Quality, Duration, Modifying Factors, Severity.)      [address] [facility] SHAYNE Wren is a 31 y.o. female who presents with headache, general of 10, no thunderclap quality, similar to previous migraine headaches. Similar headache 2 weeks ago, gets very frequent migraine headaches. This is identical to her usual migraine headache episode, denies any thunderclap quality, denies any neck rigidity, no fevers or chills, no numbness weakness, no slurring of her words, no dysarthria, no other complaints reported. Pain is 10 out of 10, started today, slowly progressive, constant, Sharp, no radiations. PAST MEDICAL / SURGICAL / SOCIAL / FAMILY HISTORY      has a past medical history of Fibromyalgia and Headache.     PSH: None    Social History     Socioeconomic History    Marital status:      Spouse name: Not on file    Number of children: Not on file    Years of education: Not on file    Highest education level: Not on file   Occupational History    Not on file   Social Needs    Financial resource strain: Not on file    Food insecurity     Worry: Not on file     Inability: Not on file    Transportation needs     Medical: Not on file     Non-medical: Not on file   Tobacco Use    Smoking status: Never Smoker    Smokeless tobacco: Never Used   Substance and Sexual Activity    Alcohol use: No    Drug use: No    Sexual activity: Not on file   Lifestyle    Physical activity     Days per week: Not on file     Minutes per session: Not on file    Stress: Not on file   Relationships    Social connections     Talks on phone: Not on file     Gets together: Not on file     Attends Sikh service: Not on file     Active member of club or organization: Not on file     Attends meetings of clubs or organizations: Not on file     Relationship status: Not on file    Intimate partner violence     Fear of current or ex partner: Not on file     Emotionally abused: Not on file     Physically abused: Not on file     Forced sexual activity: Not on file   Other Topics Concern    Not on file   Social History Narrative    Not on file       History reviewed. No pertinent family history. Allergies:  Peanut-containing drug products    Home Medications:  Prior to Admission medications    Medication Sig Start Date End Date Taking? Authorizing Provider   medical marijuana Take 1 each by mouth as needed. Yes Historical Provider, MD   eletriptan (RELPAX) 40 MG tablet Take 40 mg by mouth once as needed may repeat in 2 hours if necessary   Yes Historical Provider, MD   Acyclovir 50 MG TABS Place inside cheek   Yes Historical Provider, MD   levonorgestrel (MIRENA, 52 MG,) IUD 52 mg 1 each by Intrauterine route once   Yes Historical Provider, MD       REVIEW OF SYSTEMS    (2-9 systems for level 4, 10 or more for level 5)      Review of Systems   Constitutional: Negative for chills and fever. HENT: Negative for congestion and rhinorrhea. Respiratory: Negative for shortness of breath and wheezing. Cardiovascular: Negative for chest pain and leg swelling. Gastrointestinal: Negative for abdominal pain, nausea and vomiting. Genitourinary: Negative for dysuria. Skin: Negative for color change. Neurological: Positive for headaches. Negative for facial asymmetry, speech difficulty, weakness and numbness. Psychiatric/Behavioral: Negative for agitation.        PHYSICAL EXAM   (up to 7 for level 4, 8 or more for level 5)      INITIAL VITALS:   /77   Pulse 81   Temp 97.3 °F (36.3 °C) (Temporal)   Resp 16   SpO2 95%     Physical Exam  Vitals signs reviewed. Constitutional:       General: She is not in acute distress. Appearance: She is well-developed. HENT:      Head: Normocephalic and atraumatic. Eyes:      General:         Right eye: No discharge. Left eye: No discharge. Conjunctiva/sclera: Conjunctivae normal.   Neck:      Musculoskeletal: Normal range of motion. No neck rigidity. Cardiovascular:      Rate and Rhythm: Normal rate and regular rhythm. Heart sounds: Normal heart sounds. No murmur. No friction rub. No gallop. Pulmonary:      Effort: Pulmonary effort is normal. No respiratory distress. Breath sounds: Normal breath sounds. No wheezing or rales. Abdominal:      General: There is no distension. Palpations: Abdomen is soft. Tenderness: There is no abdominal tenderness. There is no guarding or rebound. Musculoskeletal:         General: No tenderness. Skin:     General: Skin is warm. Findings: No erythema. Neurological:      General: No focal deficit present. Mental Status: She is alert. Cranial Nerves: No cranial nerve deficit. Sensory: No sensory deficit. Motor: No weakness. Comments: AAOx3. 5/5 motor strength bilateral upper/lower extremities. Sensation preserved/intact bilateral upper/lower extremities. EOMI, no disconjugate gaze, PERRL; facial musculature, tone and sensation intact bilaterally; uvula elevates in midline; SCM strength intact bilaterally           DIFFERENTIAL  DIAGNOSIS     PLAN (LABS / IMAGING / EKG):  No orders of the defined types were placed in this encounter.       MEDICATIONS ORDERED:  Orders Placed This Encounter   Medications    metoclopramide (REGLAN) injection 10 mg    diphenhydrAMINE (BENADRYL) injection 25 mg    0.9 % sodium chloride bolus    ketorolac (TORADOL) injection 15 mg       DDX: Migraine versus tension headache versus medication overuse headache versus subarachnoid hemorrhage versus tumor      DIAGNOSTIC RESULTS / EMERGENCY DEPARTMENT COURSE / MDM   :  No results found for this visit on 04/03/21. IMPRESSION: 22-year-old female who presents to the emergency department secondary to her usual migraine headaches. No thunderclap quality, normal neurologic examination, headache symptoms are similar to usual episode. No significant concern for subarachnoid hemorrhage or meningitis at this time. No neck rigidity. Given a migraine cocktail and on reassessment symptoms are completely resolved and she feels much better and feels comfortable going home. RADIOLOGY:    No results found. EKG  None    All EKG's are interpreted by the Emergency Department Physician who either signs or Co-signs this chart in the absence of a cardiologist.    EMERGENCY DEPARTMENT COURSE:    Symptoms resolved feels much better, feels comfortable going home on reassessment. PROCEDURES:  None    CONSULTS:  None    CRITICAL CARE:  None    FINAL IMPRESSION      1.  Other migraine without status migrainosus, not intractable          DISPOSITION / PLAN     DISPOSITION Decision To Discharge 04/03/2021 11:16:24 PM      PATIENT REFERRED TO:  Lindsay Arellano  [address] [place] [address] 13 08 11    Call in 2 days        DISCHARGE MEDICATIONS:  Discharge Medication List as of 4/3/2021 11:16 PM          Candelaria Gaspar MD  Emergency Medicine Attending    (Please note that portions of thisnote were completed with a voice recognition program.  Efforts were made to edit the dictations but occasionally words are mis-transcribed.)        Candelaria Gaspar MD  04/04/21 1506

## 2021-09-01 ENCOUNTER — HOSPITAL ENCOUNTER (EMERGENCY)
Age: 31
Discharge: HOME | End: 2021-09-01
Payer: COMMERCIAL

## 2021-09-01 ENCOUNTER — APPOINTMENT (OUTPATIENT)
Dept: GENERAL RADIOLOGY | Age: 31
End: 2021-09-01
Payer: COMMERCIAL

## 2021-09-01 VITALS
SYSTOLIC BLOOD PRESSURE: 120 MMHG | DIASTOLIC BLOOD PRESSURE: 76 MMHG | TEMPERATURE: 98.6 F | OXYGEN SATURATION: 97 % | RESPIRATION RATE: 18 BRPM | HEART RATE: 97 BPM

## 2021-09-01 DIAGNOSIS — R06.00 DYSPNEA AND RESPIRATORY ABNORMALITIES: Primary | ICD-10-CM

## 2021-09-01 DIAGNOSIS — N30.00 ACUTE CYSTITIS WITHOUT HEMATURIA: ICD-10-CM

## 2021-09-01 DIAGNOSIS — R06.89 DYSPNEA AND RESPIRATORY ABNORMALITIES: Primary | ICD-10-CM

## 2021-09-01 LAB
-: ABNORMAL
ABSOLUTE EOS #: <0.03 K/UL (ref 0–0.44)
ABSOLUTE IMMATURE GRANULOCYTE: <0.03 K/UL (ref 0–0.3)
ABSOLUTE LYMPH #: 0.77 K/UL (ref 1.1–3.7)
ABSOLUTE MONO #: 0.42 K/UL (ref 0.1–1.2)
AMORPHOUS: ABNORMAL
ANION GAP SERPL CALCULATED.3IONS-SCNC: 10 MMOL/L (ref 9–17)
BACTERIA: ABNORMAL
BASOPHILS # BLD: 0 % (ref 0–2)
BASOPHILS ABSOLUTE: <0.03 K/UL (ref 0–0.2)
BILIRUBIN URINE: NEGATIVE
BUN BLDV-MCNC: 11 MG/DL (ref 6–20)
BUN/CREAT BLD: 18 (ref 9–20)
CALCIUM SERPL-MCNC: 9.1 MG/DL (ref 8.6–10.4)
CASTS UA: ABNORMAL /LPF
CHLORIDE BLD-SCNC: 99 MMOL/L (ref 98–107)
CO2: 23 MMOL/L (ref 20–31)
COLOR: YELLOW
COMMENT UA: ABNORMAL
CREAT SERPL-MCNC: 0.6 MG/DL (ref 0.5–0.9)
CRYSTALS, UA: ABNORMAL /HPF
D-DIMER QUANTITATIVE: <0.27 MG/L FEU (ref 0–0.59)
DIFFERENTIAL TYPE: ABNORMAL
EKG ATRIAL RATE: 70 BPM
EKG P AXIS: 26 DEGREES
EKG P-R INTERVAL: 122 MS
EKG Q-T INTERVAL: 366 MS
EKG QRS DURATION: 86 MS
EKG QTC CALCULATION (BAZETT): 395 MS
EKG R AXIS: 54 DEGREES
EKG T AXIS: 51 DEGREES
EKG VENTRICULAR RATE: 70 BPM
EOSINOPHILS RELATIVE PERCENT: 1 % (ref 1–4)
EPITHELIAL CELLS UA: ABNORMAL /HPF (ref 0–25)
GFR AFRICAN AMERICAN: >60 ML/MIN
GFR NON-AFRICAN AMERICAN: >60 ML/MIN
GFR SERPL CREATININE-BSD FRML MDRD: ABNORMAL ML/MIN/{1.73_M2}
GFR SERPL CREATININE-BSD FRML MDRD: ABNORMAL ML/MIN/{1.73_M2}
GLUCOSE BLD-MCNC: 92 MG/DL (ref 70–99)
GLUCOSE URINE: NEGATIVE
HCG QUALITATIVE: NEGATIVE
HCT VFR BLD CALC: 37 % (ref 36.3–47.1)
HEMOGLOBIN: 12.9 G/DL (ref 11.9–15.1)
IMMATURE GRANULOCYTES: 0 %
KETONES, URINE: NEGATIVE
LACTIC ACID: 0.8 MMOL/L (ref 0.5–2.2)
LEUKOCYTE ESTERASE, URINE: ABNORMAL
LYMPHOCYTES # BLD: 30 % (ref 24–43)
MCH RBC QN AUTO: 31.5 PG (ref 25.2–33.5)
MCHC RBC AUTO-ENTMCNC: 34.9 G/DL (ref 28.4–34.8)
MCV RBC AUTO: 90.5 FL (ref 82.6–102.9)
MONOCYTES # BLD: 17 % (ref 3–12)
MUCUS: ABNORMAL
NITRITE, URINE: NEGATIVE
NRBC AUTOMATED: 0 PER 100 WBC
OTHER OBSERVATIONS UA: ABNORMAL
PDW BLD-RTO: 12.2 % (ref 11.8–14.4)
PH UA: 8 (ref 5–9)
PLATELET # BLD: 156 K/UL (ref 138–453)
PLATELET ESTIMATE: ABNORMAL
PMV BLD AUTO: 10.9 FL (ref 8.1–13.5)
POTASSIUM SERPL-SCNC: 3.8 MMOL/L (ref 3.7–5.3)
PROTEIN UA: NEGATIVE
RBC # BLD: 4.09 M/UL (ref 3.95–5.11)
RBC # BLD: ABNORMAL 10*6/UL
RBC UA: ABNORMAL /HPF (ref 0–2)
RENAL EPITHELIAL, UA: ABNORMAL /HPF
SEG NEUTROPHILS: 52 % (ref 36–65)
SEGMENTED NEUTROPHILS ABSOLUTE COUNT: 1.31 K/UL (ref 1.5–8.1)
SODIUM BLD-SCNC: 132 MMOL/L (ref 135–144)
SPECIFIC GRAVITY UA: 1.01 (ref 1.01–1.02)
TRICHOMONAS: ABNORMAL
TROPONIN INTERP: NORMAL
TROPONIN T: NORMAL NG/ML
TROPONIN, HIGH SENSITIVITY: 6 NG/L (ref 0–14)
TURBIDITY: CLEAR
URINE HGB: NEGATIVE
UROBILINOGEN, URINE: NORMAL
WBC # BLD: 2.5 K/UL (ref 3.5–11.3)
WBC # BLD: ABNORMAL 10*3/UL
WBC UA: ABNORMAL /HPF (ref 0–5)
YEAST: ABNORMAL

## 2021-09-01 PROCEDURE — 80048 BASIC METABOLIC PNL TOTAL CA: CPT

## 2021-09-01 PROCEDURE — 96375 TX/PRO/DX INJ NEW DRUG ADDON: CPT

## 2021-09-01 PROCEDURE — 93005 ELECTROCARDIOGRAM TRACING: CPT | Performed by: EMERGENCY MEDICINE

## 2021-09-01 PROCEDURE — 71045 X-RAY EXAM CHEST 1 VIEW: CPT

## 2021-09-01 PROCEDURE — 93010 ELECTROCARDIOGRAM REPORT: CPT | Performed by: INTERNAL MEDICINE

## 2021-09-01 PROCEDURE — 83605 ASSAY OF LACTIC ACID: CPT

## 2021-09-01 PROCEDURE — 85379 FIBRIN DEGRADATION QUANT: CPT

## 2021-09-01 PROCEDURE — 81001 URINALYSIS AUTO W/SCOPE: CPT

## 2021-09-01 PROCEDURE — 85025 COMPLETE CBC W/AUTO DIFF WBC: CPT

## 2021-09-01 PROCEDURE — 84703 CHORIONIC GONADOTROPIN ASSAY: CPT

## 2021-09-01 PROCEDURE — 6360000002 HC RX W HCPCS: Performed by: PHYSICIAN ASSISTANT

## 2021-09-01 PROCEDURE — 87086 URINE CULTURE/COLONY COUNT: CPT

## 2021-09-01 PROCEDURE — 96374 THER/PROPH/DIAG INJ IV PUSH: CPT

## 2021-09-01 PROCEDURE — 84484 ASSAY OF TROPONIN QUANT: CPT

## 2021-09-01 PROCEDURE — 99283 EMERGENCY DEPT VISIT LOW MDM: CPT

## 2021-09-01 RX ORDER — DEXAMETHASONE SODIUM PHOSPHATE 10 MG/ML
6 INJECTION INTRAMUSCULAR; INTRAVENOUS ONCE
Status: COMPLETED | OUTPATIENT
Start: 2021-09-01 | End: 2021-09-01

## 2021-09-01 RX ORDER — CEPHALEXIN 500 MG/1
500 CAPSULE ORAL 3 TIMES DAILY
Qty: 15 CAPSULE | Refills: 0 | Status: SHIPPED | OUTPATIENT
Start: 2021-09-01 | End: 2021-09-06

## 2021-09-01 RX ORDER — ONDANSETRON 2 MG/ML
4 INJECTION INTRAMUSCULAR; INTRAVENOUS ONCE
Status: COMPLETED | OUTPATIENT
Start: 2021-09-01 | End: 2021-09-01

## 2021-09-01 RX ORDER — ALBUTEROL SULFATE 90 UG/1
1-2 AEROSOL, METERED RESPIRATORY (INHALATION) EVERY 6 HOURS PRN
Qty: 18 G | Refills: 0 | Status: SHIPPED | OUTPATIENT
Start: 2021-09-01

## 2021-09-01 RX ORDER — PREDNISONE 10 MG/1
10 TABLET ORAL DAILY
Qty: 5 TABLET | Refills: 0 | Status: SHIPPED | OUTPATIENT
Start: 2021-09-01 | End: 2021-09-06

## 2021-09-01 RX ORDER — .BETA.-CAROTENE, ASCORBIC ACID, CHOLECALCIFEROL, .ALPHA.-TOCOPHEROL ACETATE, DL-, THIAMINE MONONITRATE, RIBOFLAVIN, NIACINAMIDE, PYRIDOXINE HYDROCHLORIDE, FOLIC ACID, CYANOCOBALAMIN, CALCIUM PANTOTHENATE, CALCIUM CARBONATE, FERROUS FUMARATE, ZINC OXIDE, AND DOCUSATE SODIUM 1000; 100; 400; 30; 3; 3; 15; 20; 1; 12; 7; 200; 29; 20; 25 [IU]/1; MG/1; [IU]/1; [IU]/1; MG/1; MG/1; MG/1; MG/1; MG/1; UG/1; MG/1; MG/1; MG/1; MG/1; MG/1
1 TABLET, COATED ORAL DAILY
COMMUNITY

## 2021-09-01 RX ORDER — RIMEGEPANT SULFATE 75 MG/75MG
75 TABLET, ORALLY DISINTEGRATING ORAL PRN
COMMUNITY

## 2021-09-01 RX ADMIN — ONDANSETRON 4 MG: 2 INJECTION INTRAMUSCULAR; INTRAVENOUS at 11:14

## 2021-09-01 RX ADMIN — DEXAMETHASONE SODIUM PHOSPHATE 6 MG: 10 INJECTION INTRAMUSCULAR; INTRAVENOUS at 11:15

## 2021-09-01 ASSESSMENT — ENCOUNTER SYMPTOMS: COUGH: 1

## 2021-09-01 ASSESSMENT — PAIN DESCRIPTION - PAIN TYPE: TYPE: ACUTE PAIN

## 2021-09-01 ASSESSMENT — PAIN DESCRIPTION - ORIENTATION: ORIENTATION: MID;ANTERIOR

## 2021-09-01 ASSESSMENT — PAIN DESCRIPTION - FREQUENCY: FREQUENCY: INTERMITTENT

## 2021-09-01 ASSESSMENT — PAIN DESCRIPTION - DESCRIPTORS: DESCRIPTORS: SHARP

## 2021-09-01 ASSESSMENT — PAIN SCALES - GENERAL: PAINLEVEL_OUTOF10: 8

## 2021-09-01 ASSESSMENT — PAIN DESCRIPTION - LOCATION: LOCATION: CHEST

## 2021-09-01 NOTE — ED PROVIDER NOTES
[facility] ED  eMERGENCY dEPARTMENT eNCOUnter      Pt Name: Brandon Wren  MRN: [id number]  [place] 1990  Date of evaluation: 9/1/21      CHIEF COMPLAINT       Chief Complaint   Patient presents with    Shortness of Breath     pt states onset Saturday    Chest Pain     onset saturday with activity    Positive For Covid-19     sx onset Friday, tested positive Monday         HISTORY OF PRESENT ILLNESS    Roxane Wren is a 31 y.o. female who presents complaining of sob chest pain and positive for covid 19  The history is provided by the patient. URI  Presenting symptoms: congestion, cough and fatigue    Severity:  Moderate  Onset quality:  Gradual  Duration:  5 days  Timing:  Intermittent  Progression:  Waxing and waning  Chronicity:  New  Relieved by:  Nothing  Worsened by:  Breathing  Ineffective treatments:  None tried  Associated symptoms: headaches    Risk factors comment:  Tested positive for covid 19, tested pos monday, s/s started frioday      REVIEW OF SYSTEMS       Review of Systems   Constitutional: Positive for fatigue. HENT: Positive for congestion. Respiratory: Positive for cough. Cardiovascular: Positive for chest pain. Neurological: Positive for headaches. All other systems reviewed and are negative. PAST MEDICAL HISTORY     Past Medical History:   Diagnosis Date    Fibromyalgia     Headache        SURGICAL HISTORY     History reviewed. No pertinent surgical history. CURRENT MEDICATIONS       Previous Medications    ACYCLOVIR 50 MG TABS    Place inside cheek    MEDICAL MARIJUANA    Take 1 each by mouth as needed. PRENATAL VIT-DSS-FE FUM-FA (PRENATAL 19) 29-1 MG TABS    Take 1 tablet by mouth daily    RIMEGEPANT SULFATE (NURTEC) 75 MG TBDP    Take 75 mg by mouth as needed       ALLERGIES     is allergic to peanut-containing drug products. FAMILY HISTORY     has no family status information on file.         SOCIAL HISTORY      reports that she has never smoked. She has never used smokeless tobacco. She reports that she does not drink alcohol and does not use drugs. PHYSICAL EXAM     INITIAL VITALS: BP (!) 150/95   Pulse 97   Temp 98.6 °F (37 °C) (Tympanic)   Resp 18   LMP 08/18/2021   SpO2 99%      Physical Exam  Vitals and nursing note reviewed. Constitutional:       Appearance: She is well-developed. HENT:      Head: Normocephalic and atraumatic. Mouth/Throat:      Mouth: Mucous membranes are moist.   Eyes:      Pupils: Pupils are equal, round, and reactive to light. Cardiovascular:      Rate and Rhythm: Normal rate and regular rhythm. Heart sounds: Normal heart sounds. No murmur heard. Pulmonary:      Effort: Pulmonary effort is normal.      Breath sounds: Normal breath sounds. Abdominal:      General: Bowel sounds are normal.      Palpations: Abdomen is soft. Tenderness: There is no abdominal tenderness. Musculoskeletal:         General: Normal range of motion. Cervical back: Normal range of motion. Skin:     General: Skin is warm and dry. Capillary Refill: Capillary refill takes less than 2 seconds. Neurological:      Mental Status: She is alert and oriented to person, place, and time. MEDICAL DECISION MAKING:     She was diagnosed with Covid on Monday she started having symptoms on Friday. She presents today after calling her primary care provider she was sent to the emergency department as she is having some increased shortness of breath pain with inspiration generalized body aches. While in the emergency department we did check labs chest x-ray showed no acute process she was given some IV fluids some Decadron and nausea medicine. Urine shows increased bacteria and leukocyte esterase. We will treat with Keflex. Isolate as per CDC guidelines Tylenol Motrin for pain. Use incentive spirometer, albuterol inhaler and steroid.   Follow-up with your doctor as needed return if worse or other concerns. DIAGNOSTIC RESULTS     EKG: All EKG's are interpreted by the Emergency Department Physician who either signs or Co-signs this chart in the absence of acardiologist.    EKG shows normal sinus rhythm without ST elevation T wave inversion there is no ectopy. RADIOLOGY:[place] film, CT, MRI, and formal ultrasound images (except ED bedside ultrasound) are read by the radiologist and the images and interpretations are directly viewed by the emergency physician. Chest x-ray shows no acute process  FINDINGS:   The cardiomediastinal silhouette is unchanged in appearance. There is no   consolidation, pneumothorax, or evidence of edema. No effusion is   appreciated. The osseous structures are unchanged in appearance.           Impression   Unchanged appearance of the chest without acute airspace disease identified. LABS:All lab results were reviewed by myself, and all abnormals are listed below.   Labs Reviewed   CBC WITH AUTO DIFFERENTIAL - Abnormal; Notable for the following components:       Result Value    WBC 2.5 (*)     MCHC 34.9 (*)     Monocytes 17 (*)     Segs Absolute 1.31 (*)     Absolute Lymph # 0.77 (*)     All other components within normal limits   BASIC METABOLIC PANEL W/ REFLEX TO MG FOR LOW K - Abnormal; Notable for the following components:    Sodium 132 (*)     All other components within normal limits   URINE RT REFLEX TO CULTURE - Abnormal; Notable for the following components:    Leukocyte Esterase, Urine MODERATE (*)     All other components within normal limits   MICROSCOPIC URINALYSIS - Abnormal; Notable for the following components:    Bacteria, UA 2+ (*)     All other components within normal limits   CULTURE, URINE   TROPONIN   D-DIMER, QUANTITATIVE   LACTIC ACID   HCG, SERUM, QUALITATIVE         EMERGENCY DEPARTMENT COURSE:   Vitals:    Vitals:    09/01/21 1020   BP: (!) 150/95   Pulse: 97   Resp: 18   Temp: 98.6 °F (37 °C)   TempSrc: Tympanic   SpO2: 99%       The patient was given the following medications while in the emergency department:  Orders Placed This Encounter   Medications    dexamethasone (DECADRON) injection 6 mg    ondansetron (ZOFRAN) injection 4 mg    cephALEXin (KEFLEX) 500 MG capsule     Sig: Take 1 capsule by mouth 3 times daily for 5 days     Dispense:  15 capsule     Refill:  0    predniSONE (DELTASONE) 10 MG tablet     Sig: Take 1 tablet by mouth daily for 5 days     Dispense:  5 tablet     Refill:  0    albuterol sulfate HFA (PROVENTIL HFA) 108 (90 Base) MCG/ACT inhaler     Sig: Inhale 1-2 puffs into the lungs every 6 hours as needed for Wheezing or Shortness of Breath     Dispense:  18 g     Refill:  0       -------------------------      CRITICAL CARE:    CONSULTS:  None    PROCEDURES:  Procedures    FINAL IMPRESSION      1. Dyspnea and respiratory abnormalities New Problem   2.  Acute cystitis without hematuria New Problem         DISPOSITION/PLAN   DISPOSITION Decision To Discharge 09/01/2021 12:33:18 PM      PATIENT REFERREDTO:  [id number] [phone]    Schedule an appointment as soon as possible for a visit   As needed    [facility] ED  [address]  [phone]    If symptoms worsen      DISCHARGEMEDICATIONS:  New Prescriptions    ALBUTEROL SULFATE HFA (PROVENTIL HFA) 108 (90 BASE) MCG/ACT INHALER    Inhale 1-2 puffs into the lungs every 6 hours as needed for Wheezing or Shortness of Breath    CEPHALEXIN (KEFLEX) 500 MG CAPSULE    Take 1 capsule by mouth 3 times daily for 5 days    PREDNISONE (DELTASONE) 10 MG TABLET    Take 1 tablet by mouth daily for 5 days       (Please note that portions of this note were completed with a voice recognition program.  Efforts were made to edit thedictations but occasionally words are mis-transcribed.)    LACHO Tristan PA-C  09/01/21 7515

## 2021-09-01 NOTE — ED NOTES
Patient and spouse instructed on incentive spirometry. Demonstrated and verbalized understanding.  ALYSSA Garrison RN  09/01/21 1300

## 2021-09-02 LAB
CULTURE: NORMAL
Lab: NORMAL
SPECIMEN DESCRIPTION: NORMAL

## 2022-08-22 ENCOUNTER — HOSPITAL ENCOUNTER (OUTPATIENT)
Age: 32
Discharge: HOME | End: 2022-08-22
Attending: ADVANCED PRACTICE MIDWIFE | Admitting: ADVANCED PRACTICE MIDWIFE
Payer: COMMERCIAL

## 2022-08-22 VITALS
SYSTOLIC BLOOD PRESSURE: 122 MMHG | HEART RATE: 87 BPM | RESPIRATION RATE: 18 BRPM | TEMPERATURE: 98.3 F | DIASTOLIC BLOOD PRESSURE: 72 MMHG

## 2022-08-22 LAB
ABSOLUTE EOS #: <0.03 K/UL (ref 0–0.44)
ABSOLUTE IMMATURE GRANULOCYTE: 0.05 K/UL (ref 0–0.3)
ABSOLUTE LYMPH #: 0.96 K/UL (ref 1.1–3.7)
ABSOLUTE MONO #: 0.5 K/UL (ref 0.1–1.2)
ALBUMIN SERPL-MCNC: 3.8 G/DL (ref 3.5–5.2)
ALBUMIN/GLOBULIN RATIO: 1.6 (ref 1–2.5)
ALP BLD-CCNC: 118 U/L (ref 35–104)
ALT SERPL-CCNC: 14 U/L (ref 5–33)
ANION GAP SERPL CALCULATED.3IONS-SCNC: 11 MMOL/L (ref 9–17)
AST SERPL-CCNC: 17 U/L
BASOPHILS # BLD: 0 % (ref 0–2)
BASOPHILS ABSOLUTE: <0.03 K/UL (ref 0–0.2)
BILIRUB SERPL-MCNC: 0.15 MG/DL (ref 0.3–1.2)
BUN BLDV-MCNC: 4 MG/DL (ref 6–20)
BUN/CREAT BLD: 8 (ref 9–20)
CALCIUM SERPL-MCNC: 9.4 MG/DL (ref 8.6–10.4)
CHLORIDE BLD-SCNC: 104 MMOL/L (ref 98–107)
CO2: 22 MMOL/L (ref 20–31)
CREAT SERPL-MCNC: 0.5 MG/DL (ref 0.5–0.9)
CREATININE URINE: 49.6 MG/DL (ref 28–217)
EOSINOPHILS RELATIVE PERCENT: 0 % (ref 1–4)
GFR AFRICAN AMERICAN: >60 ML/MIN
GFR NON-AFRICAN AMERICAN: >60 ML/MIN
GFR SERPL CREATININE-BSD FRML MDRD: ABNORMAL ML/MIN/{1.73_M2}
GFR SERPL CREATININE-BSD FRML MDRD: ABNORMAL ML/MIN/{1.73_M2}
GLUCOSE BLD-MCNC: 106 MG/DL (ref 70–99)
HCT VFR BLD CALC: 34.2 % (ref 36.3–47.1)
HEMOGLOBIN: 11.3 G/DL (ref 11.9–15.1)
IMMATURE GRANULOCYTES: 1 %
LACTATE DEHYDROGENASE: 229 U/L (ref 135–214)
LYMPHOCYTES # BLD: 17 % (ref 24–43)
MCH RBC QN AUTO: 31.1 PG (ref 25.2–33.5)
MCHC RBC AUTO-ENTMCNC: 33 G/DL (ref 28.4–34.8)
MCV RBC AUTO: 94.2 FL (ref 82.6–102.9)
MONOCYTES # BLD: 9 % (ref 3–12)
NRBC AUTOMATED: 0 PER 100 WBC
PDW BLD-RTO: 14.4 % (ref 11.8–14.4)
PLATELET # BLD: 115 K/UL (ref 138–453)
PMV BLD AUTO: 12.1 FL (ref 8.1–13.5)
POTASSIUM SERPL-SCNC: 3.8 MMOL/L (ref 3.7–5.3)
RBC # BLD: 3.63 M/UL (ref 3.95–5.11)
SEG NEUTROPHILS: 73 % (ref 36–65)
SEGMENTED NEUTROPHILS ABSOLUTE COUNT: 4.22 K/UL (ref 1.5–8.1)
SODIUM BLD-SCNC: 137 MMOL/L (ref 135–144)
TOTAL PROTEIN, URINE: 11 MG/DL
TOTAL PROTEIN: 6.2 G/DL (ref 6.4–8.3)
URIC ACID: 4.5 MG/DL (ref 2.4–5.7)
URINE TOTAL PROTEIN CREATININE RATIO: 0.22 (ref 0–0.2)
WBC # BLD: 5.8 K/UL (ref 3.5–11.3)

## 2022-08-22 PROCEDURE — 82570 ASSAY OF URINE CREATININE: CPT

## 2022-08-22 PROCEDURE — 83615 LACTATE (LD) (LDH) ENZYME: CPT

## 2022-08-22 PROCEDURE — 59025 FETAL NON-STRESS TEST: CPT

## 2022-08-22 PROCEDURE — 84550 ASSAY OF BLOOD/URIC ACID: CPT

## 2022-08-22 PROCEDURE — 85025 COMPLETE CBC W/AUTO DIFF WBC: CPT

## 2022-08-22 PROCEDURE — 80053 COMPREHEN METABOLIC PANEL: CPT

## 2022-08-22 PROCEDURE — 6370000000 HC RX 637 (ALT 250 FOR IP): Performed by: ADVANCED PRACTICE MIDWIFE

## 2022-08-22 PROCEDURE — 84156 ASSAY OF PROTEIN URINE: CPT

## 2022-08-22 PROCEDURE — 99214 OFFICE O/P EST MOD 30 MIN: CPT

## 2022-08-22 PROCEDURE — 36415 COLL VENOUS BLD VENIPUNCTURE: CPT

## 2022-08-22 RX ORDER — FAMOTIDINE 20 MG/1
20 TABLET, FILM COATED ORAL ONCE
Status: COMPLETED | OUTPATIENT
Start: 2022-08-22 | End: 2022-08-22

## 2022-08-22 RX ORDER — FERROUS SULFATE 325(65) MG
325 TABLET ORAL
COMMUNITY

## 2022-08-22 RX ORDER — ONDANSETRON 4 MG/1
4 TABLET, ORALLY DISINTEGRATING ORAL EVERY 8 HOURS PRN
COMMUNITY

## 2022-08-22 RX ORDER — LABETALOL 100 MG/1
100 TABLET, FILM COATED ORAL ONCE
Status: COMPLETED | OUTPATIENT
Start: 2022-08-22 | End: 2022-08-22

## 2022-08-22 RX ADMIN — FAMOTIDINE 20 MG: 20 TABLET, FILM COATED ORAL at 11:18

## 2022-08-22 RX ADMIN — LABETALOL HYDROCHLORIDE 100 MG: 100 TABLET, FILM COATED ORAL at 17:02

## 2022-08-22 ASSESSMENT — PAIN DESCRIPTION - DESCRIPTORS: DESCRIPTORS: CRAMPING

## 2022-08-22 NOTE — FLOWSHEET NOTE
Talked with [facility] CNM informed of patient complains of no pain, irregular mild contractions and irritability noted, no leaking of fluid or bleeding,  patient complains of no pain, reactive strip, 0 positive blood type, all blood pressures relayed to orders received.
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
## 2022-08-22 NOTE — FLOWSHEET NOTE
Patient off unit in stable condition. Departure Mode: with significant other.     Mobility at Departure: ambulatory    Discharged to: private residence    Time of Discharge: 1600
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
## 2022-08-22 NOTE — FLOWSHEET NOTE
Patient presents to L&D today for assess fetal well being    IUP at 35 weeks     NST is reactive. Quality of tracing is satisfactory.

## 2022-08-22 NOTE — DISCHARGE INSTRUCTIONS
Laura Neither  Dr. Travis Soriano Scheller  Dr. Yuli Senior [facility] [address] [phone]   or [place] [phone]     Dr Yuli Walter MD  [facility] [address]  ([address] LozanoElise, MSN, APRN, [address]  [address]  [phone]     Dr. Prema William   [facility]  [address]   [phone]    [address] [facility] [address]  [phone]    [facility] [address] [place], [address]  [phone]      ACTIVITY LIMITATIONS:  ( x )Up and about as desired and tolerated  (  )Up to bathroom only  (  )Henrene Dry on either side  (  )Avoid heavy lifting or exercise  (  )No sex  (  )No nipple stimulation  (  )Complet bedrest  (  )Avoid using stairs  (x  )Increase fluids  **DRINK AT LEAST eight-8oz. Glasses of water daily. **    Call your Doctor if:  (x  )Contractions are every 5 minutes apart (from start of one to the start of the next contraction) lasting 60 seconds for at least 1 hour, strong enough you can not walk or talk through the contraction and regular. ( x )Bag of water breaks  (x  )Vaginal bleeding  (x  )Unusual pain occurs  (x  )Decreased fetal movement  (  ) labor:  If you have 4 contractions in an hour      Or call for a follow up appt for 22 with Volodymyr Glover CNM                                    IN CASE OF EMERGENCY CONTACT LABOR AND DELIVERY [phone].

## 2022-08-22 NOTE — FLOWSHEET NOTE
Went over discharge instructions with patient. Instruction sheet given on fetal kick count and signs  and symptoms of pre eclampsia and gone over signs of symptoms with patient. Reinforced  patient to call for appt to see Tomasz Muniz on 8/23/22.

## 2023-01-09 ENCOUNTER — HOSPITAL ENCOUNTER (EMERGENCY)
Age: 33
Discharge: HOME | End: 2023-01-09
Attending: EMERGENCY MEDICINE
Payer: COMMERCIAL

## 2023-01-09 VITALS
HEART RATE: 89 BPM | WEIGHT: 170 LBS | BODY MASS INDEX: 30.12 KG/M2 | RESPIRATION RATE: 16 BRPM | DIASTOLIC BLOOD PRESSURE: 87 MMHG | SYSTOLIC BLOOD PRESSURE: 125 MMHG | HEIGHT: 63 IN | OXYGEN SATURATION: 98 % | TEMPERATURE: 97.3 F

## 2023-01-09 DIAGNOSIS — G43.101 MIGRAINE WITH AURA AND WITH STATUS MIGRAINOSUS, NOT INTRACTABLE: Primary | ICD-10-CM

## 2023-01-09 LAB
FLU A ANTIGEN: NEGATIVE
FLU B ANTIGEN: NEGATIVE
SARS-COV-2, RAPID: NOT DETECTED
SPECIMEN DESCRIPTION: NORMAL

## 2023-01-09 PROCEDURE — 6360000002 HC RX W HCPCS: Performed by: EMERGENCY MEDICINE

## 2023-01-09 PROCEDURE — 96375 TX/PRO/DX INJ NEW DRUG ADDON: CPT

## 2023-01-09 PROCEDURE — 99284 EMERGENCY DEPT VISIT MOD MDM: CPT

## 2023-01-09 PROCEDURE — 87804 INFLUENZA ASSAY W/OPTIC: CPT

## 2023-01-09 PROCEDURE — 2580000003 HC RX 258: Performed by: EMERGENCY MEDICINE

## 2023-01-09 PROCEDURE — 87635 SARS-COV-2 COVID-19 AMP PRB: CPT

## 2023-01-09 PROCEDURE — C9803 HOPD COVID-19 SPEC COLLECT: HCPCS

## 2023-01-09 PROCEDURE — 96374 THER/PROPH/DIAG INJ IV PUSH: CPT

## 2023-01-09 RX ORDER — PROCHLORPERAZINE EDISYLATE 5 MG/ML
10 INJECTION INTRAMUSCULAR; INTRAVENOUS ONCE
Status: COMPLETED | OUTPATIENT
Start: 2023-01-09 | End: 2023-01-09

## 2023-01-09 RX ORDER — 0.9 % SODIUM CHLORIDE 0.9 %
1000 INTRAVENOUS SOLUTION INTRAVENOUS ONCE
Status: COMPLETED | OUTPATIENT
Start: 2023-01-09 | End: 2023-01-09

## 2023-01-09 RX ORDER — KETOROLAC TROMETHAMINE 30 MG/ML
30 INJECTION, SOLUTION INTRAMUSCULAR; INTRAVENOUS ONCE
Status: COMPLETED | OUTPATIENT
Start: 2023-01-09 | End: 2023-01-09

## 2023-01-09 RX ADMIN — KETOROLAC TROMETHAMINE 30 MG: 30 INJECTION, SOLUTION INTRAMUSCULAR at 17:40

## 2023-01-09 RX ADMIN — PROCHLORPERAZINE EDISYLATE 10 MG: 5 INJECTION INTRAMUSCULAR; INTRAVENOUS at 17:40

## 2023-01-09 RX ADMIN — SODIUM CHLORIDE 1000 ML: 9 INJECTION, SOLUTION INTRAVENOUS at 17:40

## 2023-01-09 ASSESSMENT — ENCOUNTER SYMPTOMS
VOMITING: 1
NAUSEA: 1
SHORTNESS OF BREATH: 0
ABDOMINAL DISTENTION: 0
BACK PAIN: 0
SORE THROAT: 0
DIARRHEA: 1

## 2023-01-09 ASSESSMENT — PAIN DESCRIPTION - LOCATION
LOCATION: HEAD

## 2023-01-09 ASSESSMENT — PAIN SCALES - GENERAL
PAINLEVEL_OUTOF10: 1
PAINLEVEL_OUTOF10: 2
PAINLEVEL_OUTOF10: 5
PAINLEVEL_OUTOF10: 10
PAINLEVEL_OUTOF10: 10

## 2023-01-09 ASSESSMENT — PAIN - FUNCTIONAL ASSESSMENT: PAIN_FUNCTIONAL_ASSESSMENT: 0-10

## 2023-01-09 NOTE — DISCHARGE INSTRUCTIONS
Drink plenty of fluids. Follow-up with your neurologist if you continue to have persistent headaches.  your prescription for Nurtec as prescribed by your doctor.

## 2023-01-09 NOTE — ED PROVIDER NOTES
[facility] ED  EMERGENCY DEPARTMENT ENCOUNTER      Pt Name: Mago Wren  MRN: [id number]  Birthdate 1990  Date of evaluation: 1/9/2023  Provider: Kelsie Tian, [facility]       Chief Complaint   Patient presents with    Nausea     Nausea, vomiting, diarrhea since yesterday. Headache     Headache starting this morning. HISTORY OF PRESENT ILLNESS   (Location/Symptom, Timing/Onset, Context/Setting, Quality, Duration, Modifying Factors, Severity)  Note limiting factors. Mago Wren is a 29 y.o. female who presents to the emergency department with nausea, vomiting and diarrhea since yesterday. Her  tells me that he was initially sick and then their 3month-old son got sick and then she got sick with similar symptoms. Her vomiting has improved but she still continues to have diarrhea and has been nauseous. Her headache started this morning but she has not taken anything for it as she states that she cannot take over-the-counter medications as it gives her \"rebound headaches\". Patient has a long history of chronic migraine headaches. She goes to her neurologist frequently and last saw him this past Friday and received an injection that she gets once every 3 months. He was supposed to call in Nurtec for her but she has not picked that up yet from the pharmacy. She denies any fever or chills. She states that she has been trying to drink but nothing stays down. She has not been vaccinated against COVID-19 or influenza. Patient has phonophobia and photophobia. REVIEW OF SYSTEMS    (2-9 systems for level 4, 10 or more for level 5)     Review of Systems   Constitutional:  Negative for fatigue and fever. HENT:  Negative for congestion and sore throat. Eyes:  Negative for visual disturbance. Respiratory:  Negative for shortness of breath. Cardiovascular:  Negative for chest pain and palpitations.    Gastrointestinal:  Positive for diarrhea, nausea and vomiting. Negative for abdominal distention. Genitourinary:  Negative for dysuria. Musculoskeletal:  Negative for back pain. Skin:  Negative for rash. Neurological:  Positive for headaches. Psychiatric/Behavioral:  Negative for suicidal ideas. Except as noted above the remainder of the review of systems was reviewed and negative. PAST MEDICAL HISTORY     Past Medical History:   Diagnosis Date    Fibromyalgia     Headache          SURGICAL HISTORY     No past surgical history on file. CURRENT MEDICATIONS       Previous Medications    ACYCLOVIR 50 MG TABS    Place inside cheek    MEDICAL MARIJUANA    Take 1 each by mouth as needed. ONDANSETRON (ZOFRAN-ODT) 4 MG DISINTEGRATING TABLET    Take 4 mg by mouth every 8 hours as needed for Nausea or Vomiting    PRENATAL VIT-DSS-FE FUM-FA (PRENATAL 19) 29-1 MG TABS    Take 1 tablet by mouth daily    RIMEGEPANT SULFATE (NURTEC) 75 MG TBDP    Take 75 mg by mouth as needed       ALLERGIES     Peanut-containing drug products and Imitrex [sumatriptan]    FAMILY HISTORY     No family history on file. SOCIAL HISTORY       Social History     Socioeconomic History    Marital status:    Tobacco Use    Smoking status: Never    Smokeless tobacco: Never   Substance and Sexual Activity    Alcohol use: No    Drug use: No       SCREENINGS        [place] Coma Scale  Eye Opening: Spontaneous  Best Verbal Response: Oriented  Best Motor Response: Obeys commands  [place] Coma Scale Score: 15               PHYSICAL EXAM    (up to 7 for level 4, 8 or more for level 5)     ED Triage Vitals [01/09/23 1642]   BP Temp Temp Source Heart Rate Resp SpO2 Height Weight   125/87 97.3 °F (36.3 °C) Tympanic 89 16 98 % 5' 3\" (1.6 m) 170 lb (77.1 kg)       Physical Exam  Constitutional:       General: She is not in acute distress. Appearance: Normal appearance. She is not toxic-appearing. HENT:      Head: Normocephalic and atraumatic.       Mouth/Throat:      Mouth: Mucous membranes are moist.   Eyes:      Extraocular Movements: Extraocular movements intact. Pupils: Pupils are equal, round, and reactive to light. Cardiovascular:      Rate and Rhythm: Normal rate and regular rhythm. Pulses: Normal pulses. Heart sounds: Normal heart sounds. Pulmonary:      Effort: Pulmonary effort is normal.      Breath sounds: Normal breath sounds. Abdominal:      General: Abdomen is flat. Bowel sounds are normal.      Palpations: Abdomen is soft. Musculoskeletal:         General: Normal range of motion. Skin:     General: Skin is warm and dry. Capillary Refill: Capillary refill takes less than 2 seconds. Neurological:      General: No focal deficit present. Mental Status: She is alert and oriented to person, place, and time. Psychiatric:         Mood and Affect: Mood normal.       DIAGNOSTIC RESULTS     EKG: All EKG's are interpreted by the Emergency Department Physician who either signs or Co-signs this chart in the absence of a cardiologist.        RADIOLOGY:   Non-plain film images such as CT, Ultrasound and MRI are read by the radiologist. Plain radiographic images are visualized and preliminarily interpreted by the emergency physician with the below findings:        Interpretation per the Radiologist below, if available at the time of this note:    No orders to display         ED BEDSIDE ULTRASOUND:   Performed by ED Physician - none    LABS:  Labs Reviewed   RAPID INFLUENZA A/B ANTIGENS   COVID-19, RAPID       All other labs were within normal range or not returned as of this dictation.     EMERGENCY DEPARTMENT COURSE and DIFFERENTIAL DIAGNOSIS/MDM:   Vitals:    Vitals:    01/09/23 1642   BP: 125/87   Pulse: 89   Resp: 16   Temp: 97.3 °F (36.3 °C)   TempSrc: Tympanic   SpO2: 98%   Weight: 170 lb (77.1 kg)   Height: 5' 3\" (1.6 m)       MDM     Amount and/or Complexity of Data Reviewed  Clinical lab tests: ordered and reviewed  Tests in the radiology section of CPT®: ordered and reviewed  Review and summarize past medical records: yes    Risk of Complications, Morbidity, and/or Mortality  Presenting problems: low  Diagnostic procedures: low  Management options: low    Patient Progress  Patient progress: stable        REASSESSMENT        Patient is feeling a lot better after IV fluids and Toradol. She currently rates her headache at a 2 out of 10 and is comfortably resting in bed in no distress. COVID and influenza were negative. Recommend drinking plenty of fluids and getting some sleep at home. She understands and has no other questions or concerns. FINAL IMPRESSION      1. Migraine with aura and with status migrainosus, not intractable          DISPOSITION/PLAN   DISPOSITION Decision To Discharge 01/09/2023 06:44:49 PM      PATIENT REFERRED TO:  Alireza Briceno  [address] [place] [address]  [phone]      As needed    DISCHARGE MEDICATIONS:  New Prescriptions    No medications on file     Controlled Substances Monitoring:     No flowsheet data found.     (Please note that portions of this note were completed with a voice recognition program.  Efforts were made to edit the dictations but occasionally words are mis-transcribed.)    Luz Marina Hahn DO (electronically signed)  Attending Emergency Physician            Luz Marina Hahn DO  01/09/23 5463

## 2023-03-14 NOTE — FLOWSHEET NOTE
Disregard message patient has been contacted   Patient denies leaking of fluid or bleeding, states baby has been active

## 2023-12-14 ENCOUNTER — APPOINTMENT (OUTPATIENT)
Dept: CT IMAGING | Age: 33
End: 2023-12-14
Payer: COMMERCIAL

## 2023-12-14 ENCOUNTER — HOSPITAL ENCOUNTER (EMERGENCY)
Age: 33
Discharge: HOME | End: 2023-12-15
Attending: EMERGENCY MEDICINE
Payer: COMMERCIAL

## 2023-12-14 VITALS
HEART RATE: 77 BPM | RESPIRATION RATE: 18 BRPM | DIASTOLIC BLOOD PRESSURE: 84 MMHG | OXYGEN SATURATION: 99 % | TEMPERATURE: 97.9 F | SYSTOLIC BLOOD PRESSURE: 124 MMHG

## 2023-12-14 DIAGNOSIS — R10.31 RIGHT LOWER QUADRANT ABDOMINAL PAIN: Primary | ICD-10-CM

## 2023-12-14 LAB
ALBUMIN SERPL-MCNC: 4.4 G/DL (ref 3.5–5.2)
ALBUMIN/GLOB SERPL: 2.1 {RATIO} (ref 1–2.5)
ALP SERPL-CCNC: 89 U/L (ref 35–104)
ALT SERPL-CCNC: 31 U/L (ref 5–33)
ANION GAP SERPL CALCULATED.3IONS-SCNC: 8 MMOL/L (ref 9–17)
AST SERPL-CCNC: 23 U/L
BACTERIA URNS QL MICRO: ABNORMAL
BASOPHILS # BLD: 0.03 K/UL (ref 0–0.2)
BASOPHILS NFR BLD: 1 % (ref 0–2)
BILIRUB SERPL-MCNC: 0.3 MG/DL (ref 0.3–1.2)
BILIRUB UR QL STRIP: NEGATIVE
BUN SERPL-MCNC: 10 MG/DL (ref 6–20)
BUN/CREAT SERPL: 14 (ref 9–20)
CALCIUM SERPL-MCNC: 9.6 MG/DL (ref 8.6–10.4)
CHLORIDE SERPL-SCNC: 101 MMOL/L (ref 98–107)
CLARITY UR: CLEAR
CO2 SERPL-SCNC: 28 MMOL/L (ref 20–31)
COLOR UR: YELLOW
CREAT SERPL-MCNC: 0.7 MG/DL (ref 0.5–0.9)
EOSINOPHIL # BLD: 0.06 K/UL (ref 0–0.44)
EOSINOPHILS RELATIVE PERCENT: 1 % (ref 1–4)
EPI CELLS #/AREA URNS HPF: ABNORMAL /HPF (ref 0–25)
ERYTHROCYTE [DISTWIDTH] IN BLOOD BY AUTOMATED COUNT: 12.1 % (ref 11.8–14.4)
GFR SERPL CREATININE-BSD FRML MDRD: >60 ML/MIN/1.73M2
GLUCOSE SERPL-MCNC: 86 MG/DL (ref 70–99)
GLUCOSE UR STRIP-MCNC: NEGATIVE MG/DL
HCG UR QL: NEGATIVE
HCT VFR BLD AUTO: 38.8 % (ref 36.3–47.1)
HGB BLD-MCNC: 13.4 G/DL (ref 11.9–15.1)
HGB UR QL STRIP.AUTO: NEGATIVE
IMM GRANULOCYTES # BLD AUTO: <0.03 K/UL (ref 0–0.3)
IMM GRANULOCYTES NFR BLD: 0 %
KETONES UR STRIP-MCNC: NEGATIVE MG/DL
LEUKOCYTE ESTERASE UR QL STRIP: ABNORMAL
LIPASE SERPL-CCNC: 25 U/L (ref 13–60)
LYMPHOCYTES NFR BLD: 1.81 K/UL (ref 1.1–3.7)
LYMPHOCYTES RELATIVE PERCENT: 34 % (ref 24–43)
MCH RBC QN AUTO: 31.5 PG (ref 25.2–33.5)
MCHC RBC AUTO-ENTMCNC: 34.5 G/DL (ref 28.4–34.8)
MCV RBC AUTO: 91.3 FL (ref 82.6–102.9)
MONOCYTES NFR BLD: 0.59 K/UL (ref 0.1–1.2)
MONOCYTES NFR BLD: 11 % (ref 3–12)
NEUTROPHILS NFR BLD: 53 % (ref 36–65)
NEUTS SEG NFR BLD: 2.89 K/UL (ref 1.5–8.1)
NITRITE UR QL STRIP: NEGATIVE
NRBC BLD-RTO: 0 PER 100 WBC
PH UR STRIP: 7 [PH] (ref 5–9)
PLATELET # BLD AUTO: 199 K/UL (ref 138–453)
PMV BLD AUTO: 10.4 FL (ref 8.1–13.5)
POTASSIUM SERPL-SCNC: 4 MMOL/L (ref 3.7–5.3)
PROT SERPL-MCNC: 6.5 G/DL (ref 6.4–8.3)
PROT UR STRIP-MCNC: NEGATIVE MG/DL
RBC # BLD AUTO: 4.25 M/UL (ref 3.95–5.11)
RBC #/AREA URNS HPF: ABNORMAL /HPF (ref 0–2)
SODIUM SERPL-SCNC: 137 MMOL/L (ref 135–144)
SP GR UR STRIP: 1.01 (ref 1.01–1.02)
UROBILINOGEN UR STRIP-ACNC: NORMAL EU/DL (ref 0–1)
WBC #/AREA URNS HPF: ABNORMAL /HPF (ref 0–5)
WBC OTHER # BLD: 5.4 K/UL (ref 3.5–11.3)

## 2023-12-14 PROCEDURE — 6360000002 HC RX W HCPCS: Performed by: EMERGENCY MEDICINE

## 2023-12-14 PROCEDURE — 81001 URINALYSIS AUTO W/SCOPE: CPT

## 2023-12-14 PROCEDURE — 96374 THER/PROPH/DIAG INJ IV PUSH: CPT

## 2023-12-14 PROCEDURE — 83690 ASSAY OF LIPASE: CPT

## 2023-12-14 PROCEDURE — 36415 COLL VENOUS BLD VENIPUNCTURE: CPT

## 2023-12-14 PROCEDURE — 85025 COMPLETE CBC W/AUTO DIFF WBC: CPT

## 2023-12-14 PROCEDURE — 6360000004 HC RX CONTRAST MEDICATION: Performed by: EMERGENCY MEDICINE

## 2023-12-14 PROCEDURE — 81025 URINE PREGNANCY TEST: CPT

## 2023-12-14 PROCEDURE — 80053 COMPREHEN METABOLIC PANEL: CPT

## 2023-12-14 PROCEDURE — 2580000003 HC RX 258: Performed by: EMERGENCY MEDICINE

## 2023-12-14 PROCEDURE — 99285 EMERGENCY DEPT VISIT HI MDM: CPT

## 2023-12-14 PROCEDURE — 74177 CT ABD & PELVIS W/CONTRAST: CPT

## 2023-12-14 PROCEDURE — 96375 TX/PRO/DX INJ NEW DRUG ADDON: CPT

## 2023-12-14 RX ORDER — 0.9 % SODIUM CHLORIDE 0.9 %
1000 INTRAVENOUS SOLUTION INTRAVENOUS ONCE
Status: COMPLETED | OUTPATIENT
Start: 2023-12-14 | End: 2023-12-14

## 2023-12-14 RX ORDER — ONDANSETRON 2 MG/ML
4 INJECTION INTRAMUSCULAR; INTRAVENOUS ONCE
Status: COMPLETED | OUTPATIENT
Start: 2023-12-14 | End: 2023-12-14

## 2023-12-14 RX ORDER — KETOROLAC TROMETHAMINE 15 MG/ML
15 INJECTION, SOLUTION INTRAMUSCULAR; INTRAVENOUS ONCE
Status: COMPLETED | OUTPATIENT
Start: 2023-12-14 | End: 2023-12-14

## 2023-12-14 RX ADMIN — SODIUM CHLORIDE 1000 ML: 9 INJECTION, SOLUTION INTRAVENOUS at 22:41

## 2023-12-14 RX ADMIN — KETOROLAC TROMETHAMINE 15 MG: 15 INJECTION, SOLUTION INTRAMUSCULAR; INTRAVENOUS at 22:41

## 2023-12-14 RX ADMIN — IOPAMIDOL 75 ML: 755 INJECTION, SOLUTION INTRAVENOUS at 23:02

## 2023-12-14 RX ADMIN — ONDANSETRON 4 MG: 2 INJECTION INTRAMUSCULAR; INTRAVENOUS at 22:41

## 2023-12-14 ASSESSMENT — PAIN DESCRIPTION - DESCRIPTORS
DESCRIPTORS: ACHING;SHARP
DESCRIPTORS: CRAMPING;DISCOMFORT

## 2023-12-14 ASSESSMENT — PAIN DESCRIPTION - ONSET: ONSET: ON-GOING

## 2023-12-14 ASSESSMENT — PAIN DESCRIPTION - ORIENTATION
ORIENTATION: RIGHT;LOWER
ORIENTATION: RIGHT

## 2023-12-14 ASSESSMENT — PAIN DESCRIPTION - PAIN TYPE
TYPE: ACUTE PAIN
TYPE: ACUTE PAIN

## 2023-12-14 ASSESSMENT — PAIN SCALES - GENERAL
PAINLEVEL_OUTOF10: 6
PAINLEVEL_OUTOF10: 4
PAINLEVEL_OUTOF10: 7

## 2023-12-14 ASSESSMENT — PAIN DESCRIPTION - LOCATION
LOCATION: ABDOMEN
LOCATION: ABDOMEN

## 2023-12-14 ASSESSMENT — PAIN - FUNCTIONAL ASSESSMENT
PAIN_FUNCTIONAL_ASSESSMENT: ACTIVITIES ARE NOT PREVENTED
PAIN_FUNCTIONAL_ASSESSMENT: 0-10

## 2023-12-14 ASSESSMENT — PAIN DESCRIPTION - FREQUENCY
FREQUENCY: INTERMITTENT
FREQUENCY: CONTINUOUS

## 2023-12-15 RX ORDER — KETOROLAC TROMETHAMINE 10 MG/1
10 TABLET, FILM COATED ORAL EVERY 8 HOURS PRN
Qty: 15 TABLET | Refills: 0 | Status: SHIPPED | OUTPATIENT
Start: 2023-12-15

## 2024-01-11 ENCOUNTER — HOSPITAL ENCOUNTER (EMERGENCY)
Age: 34
Discharge: HOME | End: 2024-01-12
Attending: EMERGENCY MEDICINE
Payer: COMMERCIAL

## 2024-01-11 DIAGNOSIS — B34.9 VIRAL ILLNESS: Primary | ICD-10-CM

## 2024-01-11 DIAGNOSIS — G43.809 OTHER MIGRAINE WITHOUT STATUS MIGRAINOSUS, NOT INTRACTABLE: ICD-10-CM

## 2024-01-11 LAB
ALBUMIN SERPL-MCNC: 4.4 G/DL (ref 3.5–5.2)
ALBUMIN/GLOB SERPL: 1.6 {RATIO} (ref 1–2.5)
ALP SERPL-CCNC: 99 U/L (ref 35–104)
ALT SERPL-CCNC: 22 U/L (ref 5–33)
ANION GAP SERPL CALCULATED.3IONS-SCNC: 11 MMOL/L (ref 9–17)
AST SERPL-CCNC: 21 U/L
BASOPHILS # BLD: <0.03 K/UL (ref 0–0.2)
BASOPHILS NFR BLD: 0 % (ref 0–2)
BILIRUB SERPL-MCNC: 0.5 MG/DL (ref 0.3–1.2)
BUN SERPL-MCNC: 12 MG/DL (ref 6–20)
BUN/CREAT SERPL: 20 (ref 9–20)
CALCIUM SERPL-MCNC: 9.2 MG/DL (ref 8.6–10.4)
CHLORIDE SERPL-SCNC: 98 MMOL/L (ref 98–107)
CO2 SERPL-SCNC: 26 MMOL/L (ref 20–31)
CREAT SERPL-MCNC: 0.6 MG/DL (ref 0.5–0.9)
EOSINOPHIL # BLD: 0.05 K/UL (ref 0–0.44)
EOSINOPHILS RELATIVE PERCENT: 1 % (ref 1–4)
ERYTHROCYTE [DISTWIDTH] IN BLOOD BY AUTOMATED COUNT: 11.8 % (ref 11.8–14.4)
GFR SERPL CREATININE-BSD FRML MDRD: >60 ML/MIN/1.73M2
GLUCOSE SERPL-MCNC: 95 MG/DL (ref 70–99)
HCT VFR BLD AUTO: 40.9 % (ref 36.3–47.1)
HGB BLD-MCNC: 14.2 G/DL (ref 11.9–15.1)
IMM GRANULOCYTES # BLD AUTO: <0.03 K/UL (ref 0–0.3)
IMM GRANULOCYTES NFR BLD: 0 %
LYMPHOCYTES NFR BLD: 0.76 K/UL (ref 1.1–3.7)
LYMPHOCYTES RELATIVE PERCENT: 12 % (ref 24–43)
MCH RBC QN AUTO: 31.3 PG (ref 25.2–33.5)
MCHC RBC AUTO-ENTMCNC: 34.7 G/DL (ref 28.4–34.8)
MCV RBC AUTO: 90.1 FL (ref 82.6–102.9)
MONOCYTES NFR BLD: 0.66 K/UL (ref 0.1–1.2)
MONOCYTES NFR BLD: 11 % (ref 3–12)
NEUTROPHILS NFR BLD: 76 % (ref 36–65)
NEUTS SEG NFR BLD: 4.67 K/UL (ref 1.5–8.1)
NRBC BLD-RTO: 0 PER 100 WBC
PLATELET # BLD AUTO: 179 K/UL (ref 138–453)
PMV BLD AUTO: 11.2 FL (ref 8.1–13.5)
POTASSIUM SERPL-SCNC: 3.6 MMOL/L (ref 3.7–5.3)
PROT SERPL-MCNC: 7.1 G/DL (ref 6.4–8.3)
RBC # BLD AUTO: 4.54 M/UL (ref 3.95–5.11)
SODIUM SERPL-SCNC: 135 MMOL/L (ref 135–144)
WBC OTHER # BLD: 6.2 K/UL (ref 3.5–11.3)

## 2024-01-11 PROCEDURE — 6360000002 HC RX W HCPCS: Performed by: EMERGENCY MEDICINE

## 2024-01-11 PROCEDURE — 99284 EMERGENCY DEPT VISIT MOD MDM: CPT

## 2024-01-11 PROCEDURE — 80053 COMPREHEN METABOLIC PANEL: CPT

## 2024-01-11 PROCEDURE — 85025 COMPLETE CBC W/AUTO DIFF WBC: CPT

## 2024-01-11 PROCEDURE — 96374 THER/PROPH/DIAG INJ IV PUSH: CPT

## 2024-01-11 PROCEDURE — 96375 TX/PRO/DX INJ NEW DRUG ADDON: CPT

## 2024-01-11 PROCEDURE — 36415 COLL VENOUS BLD VENIPUNCTURE: CPT

## 2024-01-11 RX ORDER — KETOROLAC TROMETHAMINE 30 MG/ML
30 INJECTION, SOLUTION INTRAMUSCULAR; INTRAVENOUS ONCE
Status: COMPLETED | OUTPATIENT
Start: 2024-01-11 | End: 2024-01-11

## 2024-01-11 RX ORDER — ONDANSETRON 2 MG/ML
4 INJECTION INTRAMUSCULAR; INTRAVENOUS ONCE
Status: COMPLETED | OUTPATIENT
Start: 2024-01-11 | End: 2024-01-11

## 2024-01-11 RX ORDER — DIPHENHYDRAMINE HYDROCHLORIDE 50 MG/ML
25 INJECTION INTRAMUSCULAR; INTRAVENOUS ONCE
Status: COMPLETED | OUTPATIENT
Start: 2024-01-11 | End: 2024-01-11

## 2024-01-11 RX ADMIN — KETOROLAC TROMETHAMINE 30 MG: 30 INJECTION, SOLUTION INTRAMUSCULAR; INTRAVENOUS at 23:05

## 2024-01-11 RX ADMIN — DIPHENHYDRAMINE HYDROCHLORIDE 25 MG: 50 INJECTION INTRAMUSCULAR; INTRAVENOUS at 23:05

## 2024-01-11 RX ADMIN — ONDANSETRON 4 MG: 2 INJECTION INTRAMUSCULAR; INTRAVENOUS at 23:05

## 2024-01-11 ASSESSMENT — PAIN SCALES - GENERAL: PAINLEVEL_OUTOF10: 10

## 2024-01-11 ASSESSMENT — LIFESTYLE VARIABLES
HOW MANY STANDARD DRINKS CONTAINING ALCOHOL DO YOU HAVE ON A TYPICAL DAY: PATIENT DOES NOT DRINK
HOW OFTEN DO YOU HAVE A DRINK CONTAINING ALCOHOL: NEVER

## 2024-01-11 ASSESSMENT — PAIN DESCRIPTION - LOCATION: LOCATION: ABDOMEN;HEAD

## 2024-01-11 ASSESSMENT — PAIN - FUNCTIONAL ASSESSMENT: PAIN_FUNCTIONAL_ASSESSMENT: 0-10

## 2024-01-12 ENCOUNTER — HOSPITAL ENCOUNTER (EMERGENCY)
Age: 34
Discharge: HOME | End: 2024-01-12
Payer: COMMERCIAL

## 2024-01-12 ENCOUNTER — APPOINTMENT (OUTPATIENT)
Dept: CT IMAGING | Age: 34
End: 2024-01-12
Payer: COMMERCIAL

## 2024-01-12 VITALS
DIASTOLIC BLOOD PRESSURE: 81 MMHG | RESPIRATION RATE: 16 BRPM | TEMPERATURE: 99 F | SYSTOLIC BLOOD PRESSURE: 130 MMHG | HEART RATE: 76 BPM | WEIGHT: 170 LBS | HEIGHT: 63 IN | BODY MASS INDEX: 30.12 KG/M2 | OXYGEN SATURATION: 95 %

## 2024-01-12 VITALS
HEART RATE: 96 BPM | RESPIRATION RATE: 16 BRPM | TEMPERATURE: 98.1 F | DIASTOLIC BLOOD PRESSURE: 77 MMHG | OXYGEN SATURATION: 98 % | SYSTOLIC BLOOD PRESSURE: 112 MMHG

## 2024-01-12 DIAGNOSIS — B34.9 HEADACHE DUE TO VIRAL INFECTION: Primary | ICD-10-CM

## 2024-01-12 DIAGNOSIS — B34.9 VIRAL ILLNESS: ICD-10-CM

## 2024-01-12 DIAGNOSIS — R51.9 HEADACHE DUE TO VIRAL INFECTION: Primary | ICD-10-CM

## 2024-01-12 LAB
FLUAV AG SPEC QL: NEGATIVE
FLUBV AG SPEC QL: NEGATIVE
SARS-COV-2 RDRP RESP QL NAA+PROBE: NOT DETECTED
SPECIMEN DESCRIPTION: NORMAL

## 2024-01-12 PROCEDURE — 87804 INFLUENZA ASSAY W/OPTIC: CPT

## 2024-01-12 PROCEDURE — 2580000003 HC RX 258: Performed by: NURSE PRACTITIONER

## 2024-01-12 PROCEDURE — 96375 TX/PRO/DX INJ NEW DRUG ADDON: CPT

## 2024-01-12 PROCEDURE — 6360000002 HC RX W HCPCS: Performed by: NURSE PRACTITIONER

## 2024-01-12 PROCEDURE — 99284 EMERGENCY DEPT VISIT MOD MDM: CPT

## 2024-01-12 PROCEDURE — 96374 THER/PROPH/DIAG INJ IV PUSH: CPT

## 2024-01-12 PROCEDURE — 87635 SARS-COV-2 COVID-19 AMP PRB: CPT

## 2024-01-12 PROCEDURE — 70450 CT HEAD/BRAIN W/O DYE: CPT

## 2024-01-12 PROCEDURE — 6370000000 HC RX 637 (ALT 250 FOR IP): Performed by: NURSE PRACTITIONER

## 2024-01-12 PROCEDURE — 6370000000 HC RX 637 (ALT 250 FOR IP): Performed by: EMERGENCY MEDICINE

## 2024-01-12 RX ORDER — ONDANSETRON 4 MG/1
4 TABLET, ORALLY DISINTEGRATING ORAL 3 TIMES DAILY PRN
Qty: 20 TABLET | Refills: 0 | Status: SHIPPED | OUTPATIENT
Start: 2024-01-12

## 2024-01-12 RX ORDER — ONDANSETRON 2 MG/ML
4 INJECTION INTRAMUSCULAR; INTRAVENOUS ONCE
Status: COMPLETED | OUTPATIENT
Start: 2024-01-12 | End: 2024-01-12

## 2024-01-12 RX ORDER — CETIRIZINE HYDROCHLORIDE 10 MG/1
10 TABLET ORAL DAILY
Qty: 14 TABLET | Refills: 0 | Status: SHIPPED | OUTPATIENT
Start: 2024-01-12 | End: 2024-01-26

## 2024-01-12 RX ORDER — FAMOTIDINE 20 MG/1
20 TABLET, FILM COATED ORAL ONCE
Status: COMPLETED | OUTPATIENT
Start: 2024-01-12 | End: 2024-01-12

## 2024-01-12 RX ORDER — KETOROLAC TROMETHAMINE 30 MG/ML
30 INJECTION, SOLUTION INTRAMUSCULAR; INTRAVENOUS ONCE
Status: COMPLETED | OUTPATIENT
Start: 2024-01-12 | End: 2024-01-12

## 2024-01-12 RX ORDER — HYDROCODONE BITARTRATE AND ACETAMINOPHEN 5; 325 MG/1; MG/1
1 TABLET ORAL ONCE
Status: COMPLETED | OUTPATIENT
Start: 2024-01-12 | End: 2024-01-12

## 2024-01-12 RX ORDER — DIPHENHYDRAMINE HYDROCHLORIDE 50 MG/ML
25 INJECTION INTRAMUSCULAR; INTRAVENOUS ONCE
Status: COMPLETED | OUTPATIENT
Start: 2024-01-12 | End: 2024-01-12

## 2024-01-12 RX ORDER — 0.9 % SODIUM CHLORIDE 0.9 %
1000 INTRAVENOUS SOLUTION INTRAVENOUS ONCE
Status: COMPLETED | OUTPATIENT
Start: 2024-01-12 | End: 2024-01-12

## 2024-01-12 RX ORDER — FAMOTIDINE 20 MG/1
20 TABLET, FILM COATED ORAL DAILY
Qty: 14 TABLET | Refills: 0 | Status: SHIPPED | OUTPATIENT
Start: 2024-01-12 | End: 2024-01-26

## 2024-01-12 RX ORDER — CETIRIZINE HYDROCHLORIDE 10 MG/1
10 TABLET ORAL DAILY
Status: DISCONTINUED | OUTPATIENT
Start: 2024-01-12 | End: 2024-01-12 | Stop reason: HOSPADM

## 2024-01-12 RX ADMIN — HYDROCODONE BITARTRATE AND ACETAMINOPHEN 1 TABLET: 5; 325 TABLET ORAL at 19:23

## 2024-01-12 RX ADMIN — FAMOTIDINE 20 MG: 20 TABLET, FILM COATED ORAL at 20:14

## 2024-01-12 RX ADMIN — DIPHENHYDRAMINE HYDROCHLORIDE 25 MG: 50 INJECTION INTRAMUSCULAR; INTRAVENOUS at 18:52

## 2024-01-12 RX ADMIN — CETIRIZINE HYDROCHLORIDE 10 MG: 10 TABLET, FILM COATED ORAL at 20:14

## 2024-01-12 RX ADMIN — ONDANSETRON 4 MG: 2 INJECTION INTRAMUSCULAR; INTRAVENOUS at 18:52

## 2024-01-12 RX ADMIN — HYDROCODONE BITARTRATE AND ACETAMINOPHEN 1 TABLET: 5; 325 TABLET ORAL at 00:30

## 2024-01-12 RX ADMIN — KETOROLAC TROMETHAMINE 30 MG: 30 INJECTION, SOLUTION INTRAMUSCULAR; INTRAVENOUS at 18:52

## 2024-01-12 RX ADMIN — SODIUM CHLORIDE 1000 ML: 9 INJECTION, SOLUTION INTRAVENOUS at 18:51

## 2024-01-12 ASSESSMENT — PAIN DESCRIPTION - LOCATION
LOCATION: HEAD

## 2024-01-12 ASSESSMENT — PAIN DESCRIPTION - PAIN TYPE: TYPE: ACUTE PAIN

## 2024-01-12 ASSESSMENT — PAIN SCALES - GENERAL
PAINLEVEL_OUTOF10: 8
PAINLEVEL_OUTOF10: 1
PAINLEVEL_OUTOF10: 3
PAINLEVEL_OUTOF10: 10

## 2024-01-12 ASSESSMENT — PAIN - FUNCTIONAL ASSESSMENT
PAIN_FUNCTIONAL_ASSESSMENT: 0-10
PAIN_FUNCTIONAL_ASSESSMENT: 0-10

## 2024-01-12 ASSESSMENT — ENCOUNTER SYMPTOMS: PHOTOPHOBIA: 1

## 2024-01-12 ASSESSMENT — PAIN DESCRIPTION - DESCRIPTORS
DESCRIPTORS: ACHING
DESCRIPTORS: PRESSURE

## 2024-01-12 NOTE — DISCHARGE INSTRUCTIONS
Continue current medications as prescribed.  Make sure that you stay well-hydrated.  Motrin as needed for pain.  Try Benadryl or other antihistamine in addition to Motrin if needed for recurrent headaches please follow-up with your primary care provider in 3 to 5 days if symptoms not resolved.  Seek medical attention immediately for any acute concerns

## 2024-01-12 NOTE — ED PROVIDER NOTES
[facility] ED  EMERGENCY DEPARTMENT ENCOUNTER      Pt Name: Juani Wren  MRN: [id number]  Birthdate 1990  Date of evaluation: 1/12/2024  Provider: TRACI Ocampo CNP  8:52 PM    CHIEF COMPLAINT       Chief Complaint   Patient presents with    Headache     Pt seen in ER yesterday for headache and had relief with treatment, headache returned this am, pt states diffuse head pain and noise & light sensitivity, sinus congestion.            HISTORY OF PRESENT ILLNESS        Juani Wren 32 yo female presents from home to ED with c/o generalized headache. Pt seen here yesterday-dx viral illness.. No improvement with fluids/rest. Sun with nasal congestion.     The history is provided by the patient. No  was used.       Nursing Notes were reviewed.    REVIEW OF SYSTEMS       Review of Systems   Eyes:  Positive for photophobia.   Neurological:  Positive for headaches.   All other systems reviewed and are negative.      Except as noted above the remainder of the review of systems was reviewed and negative.       PAST MEDICAL HISTORY     Past Medical History:   Diagnosis Date    Fibromyalgia     Headache          SURGICAL HISTORY     No past surgical history on file.      CURRENT MEDICATIONS       Previous Medications    ACYCLOVIR 50 MG TABS    Place inside cheek    KETOROLAC (TORADOL) 10 MG TABLET    Take 1 tablet by mouth every 8 hours as needed for Pain    MEDICAL MARIJUANA    Take 1 each by mouth as needed.    ONDANSETRON (ZOFRAN-ODT) 4 MG DISINTEGRATING TABLET    Take 4 mg by mouth every 8 hours as needed for Nausea or Vomiting    ONDANSETRON (ZOFRAN-ODT) 4 MG DISINTEGRATING TABLET    Take 1 tablet by mouth 3 times daily as needed for Nausea or Vomiting    PRENATAL VIT-DSS-FE FUM-FA (PRENATAL 19) 29-1 MG TABS    Take 1 tablet by mouth daily    RIMEGEPANT SULFATE (NURTEC) 75 MG TBDP    Take 75 mg by mouth as needed       ALLERGIES     Peanut-containing drug products,

## 2024-01-12 NOTE — ED PROVIDER NOTES
[facility] ED  EMERGENCY DEPARTMENT ENCOUNTER      Pt Name: Juani Wren  MRN: [id number]  Birthdate 1990  Date of evaluation: 1/11/2024  Provider: Vee Langford MD    CHIEF COMPLAINT       Chief Complaint   Patient presents with    Emesis    Nausea    Abdominal Pain     Pt c/o N/V/D, abd pain, congestion, diarrhea, and migraine. Pt notes congestion for 3 days and rest of s/s for 2 days. Pt states took Zofran at approx 2030 and had emesis almost 30 minutes later.     Diarrhea    Nasal Congestion    Migraine         HISTORY OF PRESENT ILLNESS   (Location/Symptom, Timing/Onset, Context/Setting, Quality, Duration, Modifying Factors, Severity)  Note limiting factors.   Juani Wren is a 33 y.o. female who presents to the emergency department ***     HPI    Nursing Notes were reviewed.    REVIEW OF SYSTEMS    (2-9 systems for level 4, 10 or more for level 5)     Review of Systems    Except as noted above the remainder of the review of systems was reviewed and negative.       PAST MEDICAL HISTORY     Past Medical History:   Diagnosis Date    Fibromyalgia     Headache          SURGICAL HISTORY     History reviewed. No pertinent surgical history.      CURRENT MEDICATIONS       Previous Medications    ACYCLOVIR 50 MG TABS    Place inside cheek    KETOROLAC (TORADOL) 10 MG TABLET    Take 1 tablet by mouth every 8 hours as needed for Pain    MEDICAL MARIJUANA    Take 1 each by mouth as needed.    ONDANSETRON (ZOFRAN-ODT) 4 MG DISINTEGRATING TABLET    Take 4 mg by mouth every 8 hours as needed for Nausea or Vomiting    PRENATAL VIT-DSS-FE FUM-FA (PRENATAL 19) 29-1 MG TABS    Take 1 tablet by mouth daily    RIMEGEPANT SULFATE (NURTEC) 75 MG TBDP    Take 75 mg by mouth as needed       ALLERGIES     Peanut-containing drug products, Amoxicillin-pot clavulanate, and Imitrex [sumatriptan]    FAMILY HISTORY     History reviewed. No pertinent family history.       SOCIAL HISTORY       Social History     Socioeconomic

## 2024-01-13 NOTE — DISCHARGE INSTRUCTIONS
Cetirizine 10 mg 1 by mouth daily x 14 days  Famotidine 20 mg 1 by mouth daily x 14 days  Increase fluids.   Explanation of exam/test

## 2024-12-11 ENCOUNTER — HOSPITAL ENCOUNTER (EMERGENCY)
Age: 34
Discharge: HOME | End: 2024-12-11
Payer: COMMERCIAL

## 2024-12-11 VITALS
RESPIRATION RATE: 16 BRPM | TEMPERATURE: 98.2 F | SYSTOLIC BLOOD PRESSURE: 130 MMHG | HEART RATE: 87 BPM | DIASTOLIC BLOOD PRESSURE: 102 MMHG | OXYGEN SATURATION: 98 %

## 2024-12-11 DIAGNOSIS — K52.9 GASTROENTERITIS: Primary | ICD-10-CM

## 2024-12-11 LAB
ANION GAP SERPL CALCULATED.3IONS-SCNC: 9 MMOL/L (ref 9–16)
BASOPHILS # BLD: <0.03 K/UL (ref 0–0.2)
BASOPHILS NFR BLD: 1 % (ref 0–2)
BUN SERPL-MCNC: 14 MG/DL (ref 6–20)
BUN/CREAT SERPL: 18 (ref 9–20)
CALCIUM SERPL-MCNC: 8.7 MG/DL (ref 8.6–10.4)
CHLORIDE SERPL-SCNC: 103 MMOL/L (ref 98–107)
CO2 SERPL-SCNC: 25 MMOL/L (ref 20–31)
CREAT SERPL-MCNC: 0.8 MG/DL (ref 0.5–0.9)
EOSINOPHIL # BLD: 0.04 K/UL (ref 0–0.44)
EOSINOPHILS RELATIVE PERCENT: 1 % (ref 1–4)
ERYTHROCYTE [DISTWIDTH] IN BLOOD BY AUTOMATED COUNT: 11.8 % (ref 11.8–14.4)
GFR, ESTIMATED: >90 ML/MIN/1.73M2
GLUCOSE SERPL-MCNC: 86 MG/DL (ref 74–99)
HCT VFR BLD AUTO: 38.3 % (ref 36.3–47.1)
HGB BLD-MCNC: 13.7 G/DL (ref 11.9–15.1)
IMM GRANULOCYTES # BLD AUTO: <0.03 K/UL (ref 0–0.3)
IMM GRANULOCYTES NFR BLD: 0 %
LYMPHOCYTES NFR BLD: 0.82 K/UL (ref 1.1–3.7)
LYMPHOCYTES RELATIVE PERCENT: 20 % (ref 24–43)
MCH RBC QN AUTO: 31.4 PG (ref 25.2–33.5)
MCHC RBC AUTO-ENTMCNC: 35.8 G/DL (ref 28.4–34.8)
MCV RBC AUTO: 87.8 FL (ref 82.6–102.9)
MONOCYTES NFR BLD: 0.45 K/UL (ref 0.1–1.2)
MONOCYTES NFR BLD: 11 % (ref 3–12)
NEUTROPHILS NFR BLD: 67 % (ref 36–65)
NEUTS SEG NFR BLD: 2.69 K/UL (ref 1.5–8.1)
NRBC BLD-RTO: 0 PER 100 WBC
PLATELET # BLD AUTO: 172 K/UL (ref 138–453)
PMV BLD AUTO: 11.1 FL (ref 8.1–13.5)
POTASSIUM SERPL-SCNC: 3.7 MMOL/L (ref 3.7–5.3)
RBC # BLD AUTO: 4.36 M/UL (ref 3.95–5.11)
SODIUM SERPL-SCNC: 137 MMOL/L (ref 136–145)
WBC OTHER # BLD: 4 K/UL (ref 3.5–11.3)

## 2024-12-11 PROCEDURE — 6360000002 HC RX W HCPCS: Performed by: PHYSICIAN ASSISTANT

## 2024-12-11 PROCEDURE — 2580000003 HC RX 258: Performed by: PHYSICIAN ASSISTANT

## 2024-12-11 PROCEDURE — 6370000000 HC RX 637 (ALT 250 FOR IP): Performed by: PHYSICIAN ASSISTANT

## 2024-12-11 PROCEDURE — 96374 THER/PROPH/DIAG INJ IV PUSH: CPT

## 2024-12-11 PROCEDURE — 80048 BASIC METABOLIC PNL TOTAL CA: CPT

## 2024-12-11 PROCEDURE — 85025 COMPLETE CBC W/AUTO DIFF WBC: CPT

## 2024-12-11 PROCEDURE — 99284 EMERGENCY DEPT VISIT MOD MDM: CPT

## 2024-12-11 PROCEDURE — 36415 COLL VENOUS BLD VENIPUNCTURE: CPT

## 2024-12-11 RX ORDER — 0.9 % SODIUM CHLORIDE 0.9 %
1000 INTRAVENOUS SOLUTION INTRAVENOUS ONCE
Status: COMPLETED | OUTPATIENT
Start: 2024-12-11 | End: 2024-12-11

## 2024-12-11 RX ORDER — ONDANSETRON 4 MG/1
4 TABLET, ORALLY DISINTEGRATING ORAL 3 TIMES DAILY PRN
Qty: 21 TABLET | Refills: 0 | Status: SHIPPED | OUTPATIENT
Start: 2024-12-11

## 2024-12-11 RX ORDER — DIPHENOXYLATE HYDROCHLORIDE AND ATROPINE SULFATE 2.5; .025 MG/1; MG/1
1 TABLET ORAL ONCE
Status: COMPLETED | OUTPATIENT
Start: 2024-12-11 | End: 2024-12-11

## 2024-12-11 RX ORDER — ONDANSETRON 2 MG/ML
4 INJECTION INTRAMUSCULAR; INTRAVENOUS ONCE
Status: COMPLETED | OUTPATIENT
Start: 2024-12-11 | End: 2024-12-11

## 2024-12-11 RX ORDER — DIPHENOXYLATE HYDROCHLORIDE AND ATROPINE SULFATE 2.5; .025 MG/1; MG/1
1 TABLET ORAL 4 TIMES DAILY PRN
Qty: 12 TABLET | Refills: 0 | Status: SHIPPED | OUTPATIENT
Start: 2024-12-11 | End: 2024-12-15

## 2024-12-11 RX ADMIN — SODIUM CHLORIDE 1000 ML: 9 INJECTION, SOLUTION INTRAVENOUS at 18:59

## 2024-12-11 RX ADMIN — DIPHENOXYLATE HYDROCHLORIDE AND ATROPINE SULFATE 1 TABLET: 2.5; .025 TABLET ORAL at 18:57

## 2024-12-11 RX ADMIN — ONDANSETRON 4 MG: 2 INJECTION, SOLUTION INTRAMUSCULAR; INTRAVENOUS at 18:57

## 2024-12-11 ASSESSMENT — ENCOUNTER SYMPTOMS
ABDOMINAL PAIN: 0
VOMITING: 0
DIARRHEA: 1
SHORTNESS OF BREATH: 0
COUGH: 0
NAUSEA: 1

## 2024-12-11 NOTE — ED PROVIDER NOTES
Vital signs stable.  Blood work shows no acute abnormality such as electrolyte depletion.  Patient received a liter of IV fluids, Zofran, and Lomotil.  She had improvement of her symptoms.  Plan will be discharge home and continued on same.           REASSESSMENT            CRITICAL CARE TIME     Total Critical Care time was 0 minutes, excluding separately reportable procedures.  There was a high probability of clinically significant/life threatening deterioration in the patient's condition which required my urgent intervention.      PROCEDURES:  Unless otherwise noted below, none     Procedures    FINAL IMPRESSION      1. Gastroenteritis          DISPOSITION/PLAN     DISPOSITION Decision To Discharge 12/11/2024 07:35:14 PM               PATIENT REFERRED TO:  Tiffany Jennings MD  [address] [facility] [address]    Schedule an appointment as soon as possible for a visit   As needed      DISCHARGE MEDICATIONS:  Discharge Medication List as of 12/11/2024  7:37 PM        START taking these medications    Details   ondansetron (ZOFRAN-ODT) 4 MG disintegrating tablet Take 1 tablet by mouth 3 times daily as needed for Nausea or Vomiting, Disp-21 tablet, R-0Normal      diphenoxylate-atropine (LOMOTIL) 2.5-0.025 MG per tablet Take 1 tablet by mouth 4 times daily as needed for Diarrhea for up to 4 days. Max Daily Amount: 4 tablets, Disp-12 tablet, R-0Normal             (Please note that portions of this note were completed with a voice recognition program.  Efforts were made to edit the dictations but occasionally words are mis-transcribed.)    Avelina Uribe PA-C (electronically signed)  Attending Emergency Physician           Avelina Uribe PA-C  12/11/24 2004

## 2024-12-12 NOTE — DISCHARGE INSTRUCTIONS
Follow a clear liquid diet and slowly progress to a bland diet.  Use Zofran as needed for nausea and Lomotil to control diarrhea.

## 2024-12-18 ENCOUNTER — HOSPITAL ENCOUNTER (EMERGENCY)
Age: 34
Discharge: HOME | End: 2024-12-18
Attending: EMERGENCY MEDICINE
Payer: COMMERCIAL

## 2024-12-18 VITALS
HEART RATE: 99 BPM | SYSTOLIC BLOOD PRESSURE: 125 MMHG | DIASTOLIC BLOOD PRESSURE: 88 MMHG | RESPIRATION RATE: 14 BRPM | TEMPERATURE: 98.2 F | OXYGEN SATURATION: 97 %

## 2024-12-18 DIAGNOSIS — R19.7 DIARRHEA, UNSPECIFIED TYPE: ICD-10-CM

## 2024-12-18 DIAGNOSIS — R10.32 LEFT LOWER QUADRANT ABDOMINAL PAIN: Primary | ICD-10-CM

## 2024-12-18 DIAGNOSIS — R11.2 NAUSEA AND VOMITING, UNSPECIFIED VOMITING TYPE: ICD-10-CM

## 2024-12-18 LAB
ALBUMIN SERPL-MCNC: 4.3 G/DL (ref 3.5–5.2)
ALBUMIN/GLOB SERPL: 1.8 {RATIO} (ref 1–2.5)
ALP SERPL-CCNC: 106 U/L (ref 35–104)
ALT SERPL-CCNC: 34 U/L (ref 10–35)
ANION GAP SERPL CALCULATED.3IONS-SCNC: 11 MMOL/L (ref 9–16)
AST SERPL-CCNC: 28 U/L (ref 10–35)
BASOPHILS # BLD: <0.03 K/UL (ref 0–0.2)
BASOPHILS NFR BLD: 1 % (ref 0–2)
BILIRUB SERPL-MCNC: 0.5 MG/DL (ref 0–1.2)
BUN SERPL-MCNC: 12 MG/DL (ref 6–20)
BUN/CREAT SERPL: 13 (ref 9–20)
CALCIUM SERPL-MCNC: 9.1 MG/DL (ref 8.6–10.4)
CHLORIDE SERPL-SCNC: 105 MMOL/L (ref 98–107)
CO2 SERPL-SCNC: 25 MMOL/L (ref 20–31)
CREAT SERPL-MCNC: 0.9 MG/DL (ref 0.5–0.9)
EOSINOPHIL # BLD: 0.08 K/UL (ref 0–0.44)
EOSINOPHILS RELATIVE PERCENT: 2 % (ref 1–4)
ERYTHROCYTE [DISTWIDTH] IN BLOOD BY AUTOMATED COUNT: 11.9 % (ref 11.8–14.4)
GFR, ESTIMATED: >90 ML/MIN/1.73M2
GLUCOSE SERPL-MCNC: 78 MG/DL (ref 74–99)
HCT VFR BLD AUTO: 40.4 % (ref 36.3–47.1)
HGB BLD-MCNC: 14.5 G/DL (ref 11.9–15.1)
IMM GRANULOCYTES # BLD AUTO: <0.03 K/UL (ref 0–0.3)
IMM GRANULOCYTES NFR BLD: 0 %
LYMPHOCYTES NFR BLD: 1.03 K/UL (ref 1.1–3.7)
LYMPHOCYTES RELATIVE PERCENT: 23 % (ref 24–43)
MCH RBC QN AUTO: 31.2 PG (ref 25.2–33.5)
MCHC RBC AUTO-ENTMCNC: 35.9 G/DL (ref 28.4–34.8)
MCV RBC AUTO: 86.9 FL (ref 82.6–102.9)
MONOCYTES NFR BLD: 0.43 K/UL (ref 0.1–1.2)
MONOCYTES NFR BLD: 10 % (ref 3–12)
NEUTROPHILS NFR BLD: 64 % (ref 36–65)
NEUTS SEG NFR BLD: 2.87 K/UL (ref 1.5–8.1)
NRBC BLD-RTO: 0 PER 100 WBC
PLATELET # BLD AUTO: 177 K/UL (ref 138–453)
PMV BLD AUTO: 10.6 FL (ref 8.1–13.5)
POTASSIUM SERPL-SCNC: 3.7 MMOL/L (ref 3.7–5.3)
PROT SERPL-MCNC: 6.7 G/DL (ref 6.6–8.7)
RBC # BLD AUTO: 4.65 M/UL (ref 3.95–5.11)
SODIUM SERPL-SCNC: 141 MMOL/L (ref 136–145)
WBC OTHER # BLD: 4.4 K/UL (ref 3.5–11.3)

## 2024-12-18 PROCEDURE — 6360000002 HC RX W HCPCS: Performed by: EMERGENCY MEDICINE

## 2024-12-18 PROCEDURE — 36415 COLL VENOUS BLD VENIPUNCTURE: CPT

## 2024-12-18 PROCEDURE — 96374 THER/PROPH/DIAG INJ IV PUSH: CPT

## 2024-12-18 PROCEDURE — 99284 EMERGENCY DEPT VISIT MOD MDM: CPT

## 2024-12-18 PROCEDURE — 96375 TX/PRO/DX INJ NEW DRUG ADDON: CPT

## 2024-12-18 PROCEDURE — 80053 COMPREHEN METABOLIC PANEL: CPT

## 2024-12-18 PROCEDURE — 2580000003 HC RX 258: Performed by: EMERGENCY MEDICINE

## 2024-12-18 PROCEDURE — 85025 COMPLETE CBC W/AUTO DIFF WBC: CPT

## 2024-12-18 RX ORDER — METOCLOPRAMIDE HYDROCHLORIDE 5 MG/ML
10 INJECTION INTRAMUSCULAR; INTRAVENOUS ONCE
Status: COMPLETED | OUTPATIENT
Start: 2024-12-18 | End: 2024-12-18

## 2024-12-18 RX ORDER — DIPHENHYDRAMINE HYDROCHLORIDE 50 MG/ML
25 INJECTION INTRAMUSCULAR; INTRAVENOUS ONCE
Status: COMPLETED | OUTPATIENT
Start: 2024-12-18 | End: 2024-12-18

## 2024-12-18 RX ORDER — ONDANSETRON 4 MG/1
4 TABLET, FILM COATED ORAL EVERY 8 HOURS PRN
Qty: 20 TABLET | Refills: 0 | Status: SHIPPED | OUTPATIENT
Start: 2024-12-18

## 2024-12-18 RX ORDER — 0.9 % SODIUM CHLORIDE 0.9 %
1000 INTRAVENOUS SOLUTION INTRAVENOUS ONCE
Status: COMPLETED | OUTPATIENT
Start: 2024-12-18 | End: 2024-12-18

## 2024-12-18 RX ORDER — KETOROLAC TROMETHAMINE 30 MG/ML
30 INJECTION, SOLUTION INTRAMUSCULAR; INTRAVENOUS ONCE
Status: COMPLETED | OUTPATIENT
Start: 2024-12-18 | End: 2024-12-18

## 2024-12-18 RX ADMIN — SODIUM CHLORIDE 1000 ML: 9 INJECTION, SOLUTION INTRAVENOUS at 12:57

## 2024-12-18 RX ADMIN — METOCLOPRAMIDE 10 MG: 5 INJECTION, SOLUTION INTRAMUSCULAR; INTRAVENOUS at 13:00

## 2024-12-18 RX ADMIN — DIPHENHYDRAMINE HYDROCHLORIDE 25 MG: 50 INJECTION INTRAMUSCULAR; INTRAVENOUS at 13:00

## 2024-12-18 RX ADMIN — KETOROLAC TROMETHAMINE 30 MG: 30 INJECTION, SOLUTION INTRAMUSCULAR at 13:00

## 2024-12-18 ASSESSMENT — PAIN SCALES - GENERAL
PAINLEVEL_OUTOF10: 3
PAINLEVEL_OUTOF10: 10

## 2024-12-18 ASSESSMENT — PAIN DESCRIPTION - LOCATION
LOCATION: HEAD;ABDOMEN
LOCATION: ABDOMEN
LOCATION: HEAD

## 2024-12-18 ASSESSMENT — PAIN - FUNCTIONAL ASSESSMENT: PAIN_FUNCTIONAL_ASSESSMENT: 0-10

## 2024-12-18 ASSESSMENT — PAIN DESCRIPTION - DESCRIPTORS: DESCRIPTORS: ACHING

## 2024-12-18 NOTE — ED PROVIDER NOTES
[facility] EMERGENCY DEPARTMENT  EMERGENCY DEPARTMENT ENCOUNTER      Pt Name: Juani Wren  MRN: [id number]  Birthdate 1990  Date of evaluation: 12/18/2024  Provider: Dony Henry Jr, MD    CHIEF COMPLAINT     Chief Complaint   Patient presents with    Abdominal Pain     Pt has had n/v/d ongoing for 2 weeks, also complains of headache. Pt complains of lower abdominal pain. Seen by Gastrointestinal Dr and had a GI pannel done, waiting results.          HISTORY OF PRESENT ILLNESS   (Location/Symptom, Timing/Onset, Context/Setting,Quality, Duration, Modifying Factors, Severity)  Note limiting factors.   Juani Wren is a34 y.o. female who presents to the emergency department with a complaint of diarrhea for the last 2 weeks.  Patient is seen her GI doctor and had stool samples done as well as lab work.  It sounds like it has been negative.  She has been seen in the emergency departments and had a CT of her abdomen that was apparently negative within the last month.  Patient had her gallbladder out and September of this year and then has had intermittent crampy abdominal pain in the left lower quadrant associated with diarrhea 10 stools per day.  She also began vomiting this morning and developed a migraine secondary to this and that is what made her decide to come to the emergency room.  Much of her workup has been done at [facility] sites which I do not have access to.    HPI    Nursing Notes werereviewed.    REVIEW OF SYSTEMS    (2-9 systems for level 4, 10 or more for level 5)     Review of Systems    Except as noted above the remainder of the review of systems was reviewed and negative.       PAST MEDICAL HISTORY     Past Medical History:   Diagnosis Date    Fibromyalgia     Headache          SURGICALHISTORY     No past surgical history on file.      CURRENT MEDICATIONS       Previous Medications    ACYCLOVIR 50 MG TABS    Place inside cheek    FAMOTIDINE (PEPCID) 20 MG TABLET    Take 1 tablet by mouth

## 2024-12-18 NOTE — DISCHARGE INSTRUCTIONS
Clear liquid diet for the next 24 hours.  Use Zofran for your nausea.  Stop the cholestyramine and see if that is causing you to have the diarrhea.  Return if you get worse.

## 2024-12-18 NOTE — ED NOTES
Called [facility] for consult with Dr Xuan GRACIA at [facility]. They will page and call back

## 2024-12-29 ENCOUNTER — HOSPITAL ENCOUNTER (EMERGENCY)
Age: 34
Discharge: HOME | End: 2024-12-29
Payer: COMMERCIAL

## 2024-12-29 VITALS
SYSTOLIC BLOOD PRESSURE: 141 MMHG | DIASTOLIC BLOOD PRESSURE: 93 MMHG | HEART RATE: 83 BPM | OXYGEN SATURATION: 99 % | TEMPERATURE: 98.5 F

## 2024-12-29 VITALS — BODY MASS INDEX: 29.4 KG/M2

## 2024-12-29 DIAGNOSIS — M25.552: Primary | ICD-10-CM

## 2024-12-29 PROCEDURE — 73502 X-RAY EXAM HIP UNI 2-3 VIEWS: CPT

## 2024-12-29 PROCEDURE — 99283 EMERGENCY DEPT VISIT LOW MDM: CPT

## 2024-12-29 NOTE — ED.LOWEXI1
HPI
HPI - Extremity Injury (Lower)
General
Chief Complaint: Extremity Injury, Lower
Stated Complaint: HIP/GROIN PAIN
Time Seen by Provider: 12/29/24 10:44
Source: patient
Mode of arrival: Wheelchair
History of Present Illness
HPI Narrative: 
Here complaining of pain in her left hip.  She states that she has never had problems with her hip previously.  She states the other day she just routinely getting out of bed and felt a popping sensation.  She has been able to walk on it but it has 
been progressively getting more and more painful.  The pain is in her left groin area.  She does not have any radiculopathy type symptoms and does not have any pain in the low back area.  She has no history of kidney stones.  She has no problems 
voiding.  She has not seen any blood in her urine.  She has no loss of sensation in her lower leg.  The pain is made worse when she flexes and extends the hip.  She has no symptoms on the right side.  She has not taken any anti-inflammatories or 
over-the-counter medication because her neurologist told her that she would have a rebound migraine if she took any medicines of that nature.
Related Data
Home Medications

?Medication ?Instructions ?Recorded ?Confirmed
aripiprazole 5 mg tablet 5 mg PO DAILY 12/29/24 12/29/24
colestipol 1 gram tablet 1 g PO DAILY 12/29/24 12/29/24
dicyclomine 10 mg capsule 10 mg PO DAILY 12/29/24 12/29/24
hydroxyzine pamoate 50 mg capsule 50 mg PO DAILY 12/29/24 12/29/24
lamotrigine 25 mg tablet 25 mg PO DAILY 12/29/24 12/29/24
sertraline 50 mg tablet 50 mg PO DAILY 12/29/24 12/29/24
trazodone 50 mg tablet 50 mg PO DAILY 12/29/24 12/29/24
triamcinolone acetonide 0.1 % 1 applic topical DAILY 12/29/24 12/29/24
topical cream   
valacyclovir 500 mg tablet 500 mg PO DAILY 12/29/24 12/29/24


Allergies

Allergy/AdvReac Type Severity Reaction Status Date / Time
amoxicillin (From Augmentin) Allergy Severe Nausea Verified 12/29/24 10:27
clavulanic acid (From Allergy Severe Nausea Verified 12/29/24 10:27
Augmentin)     
sumatriptan (From Imitrex) Allergy Severe Nausea Verified 12/29/24 10:27



Opioid HPI
Opioid Management
Most Recent Pain and Opioid Data: 
      No Data to Display


PFSH
PFSH
Social History
Little interest or pleasure in doing things:  not at all 
Feeling down, depressed, or hopeless:  not at all 



Exam
Narrative
Exam Narrative: 
Awake alert [place] x 3.  She is sitting with her left hip in a moderately flexed position.

Her pulses to the distal extremity are normal and there is no evidence of edema cellulitis or ropiness in the leg.  She has no pain in the ankle or the knee joint.  She does have some discomfort when flexing the left groin.  She does not have any 
pain in the back area.  There is no skin lesions noted in the affected area.  Peripheral perfusion is fine to that leg.
Constitutional
Vital Signs, click to edit/add: 

Last Vital Signs

Temp  98.5 F   12/29/24 10:22
Pulse  83   12/29/24 10:22
Resp  18   12/29/24 10:22
BP  141/93 H  12/29/24 10:22
Pulse Ox  99   12/29/24 10:22
O2 Del Method  Room Air  12/29/24 10:22




Course
Vital Signs
Vital signs: 

Vital Signs

Temperature  98.5 F   12/29/24 10:22
Pulse Rate  83   12/29/24 10:22
Respiratory Rate  18   12/29/24 10:22
Blood Pressure  141/93 H  12/29/24 10:22
Pulse Oximetry  99   12/29/24 10:22
Oxygen Delivery Method  Room Air  12/29/24 10:22



Temperature  98.5 F   12/29/24 10:22
Pulse Rate  83   12/29/24 10:22
Respiratory Rate  18   12/29/24 10:22
Blood Pressure  141/93 H  12/29/24 10:22
Pulse Oximetry  99   12/29/24 10:22
Oxygen Delivery Method  Room Air  12/29/24 10:22




MDM - Extremity Injury (Lower)
MDM Narrative
Medical decision making narrative: 
Patient here with a popping sensation several days ago with increasing pain in her left groin and hip area.  X-rays do not show any severe degenerative changes or abnormalities.  They will be reviewed by the radiologist.  She is afebrile I see no 
indication of a septic joint.  I see no indication of neurovascular compromise.  She says that she cannot take over-the-counter NSAIDs because of worsening of her migraine situation.  We discussed the pros and cons of a steroid pack and 
over-the-counter Tylenol for pain management.  She will be referred to orthopedics for further evaluation

Discharge Plan
Discharge
Chief Complaint: Extremity Injury, Lower

Clinical Impression:
 Acute pain of left hip


Patient Disposition: Home, Self-Care

Time of Disposition Decision: 11:17

Prescriptions / Home Meds:
No Action
  aripiprazole 5 mg tablet 
   5 mg PO DAILY 
  colestipol 1 gram tablet 
   1 g PO DAILY 
  dicyclomine 10 mg capsule 
   10 mg PO DAILY 
  hydroxyzine pamoate 50 mg capsule 
   50 mg PO DAILY 
  lamotrigine 25 mg tablet 
   25 mg PO DAILY 
  sertraline 50 mg tablet 
   50 mg PO DAILY 
  trazodone 50 mg tablet 
   50 mg PO DAILY 
  triamcinolone acetonide 0.1 % cream 
   1 applic TOPICAL DAILY 
  valacyclovir 500 mg tablet 
   500 mg PO DAILY 

Print Language: English

Additional Instructions:
Start Medrol pack.  Restrict movement for 48 hours/follow-up with orthopedics

Referrals:
DEYA BOWER [Primary Care Provider] - 1 week

## 2024-12-29 NOTE — XR_ITS
The [address] [address] 
     [phone] 
  
  
Patient Name: 
MADELYN SEBASTIAN 
  
MRN: [id number]    YOB: 1990    Sex: F 
Assigned Patient Location: ER 
Current Patient Location:   
Accession/Order Number: [id number] 
Exam Date: 12/29/2024  10:53    Report Date: 12/29/2024  11:41 
  
At the request of: 
CRISTINE NASH   
  
Procedure:  XR hip LT 2V w/ pelvis 
  
HISTORY: Left hip pain after feeling a pop in the hip getting out of bed  
today. 
  
XR hip LT 2V w/ pelvis: 12/29/2024 10:53 AM EST 
  
COMPARISON: None. 
  
FINDINGS: There is a radiopaque intrauterine device projecting over the  
central  
pelvis. There are mild degenerative changes of the pubic symphysis. No  
fracture  
or dislocation is seen. Incidental note is made of a bone island in the  
intertrochanteric region of the right femur. No significant joint space  
narrowing of the left hip is seen. 
  
ORDER #: [id number] XR/XR hip LT 2V w/ pelvis  
IMPRESSION:  
1. No fracture, dislocation or significant degenerative change of the left hip 
  
   
is seen.  
2. Mild degenerative change of the pubic symphysis.  
   
   
   
   
Electronically authenticated by: YUE HINES   Date: 12/29/2024  11:41

## 2024-12-29 NOTE — ED_ITS
HPI    
HPI - Extremity Injury (Lower)    
General    
Chief Complaint: Extremity Injury, Lower    
Stated Complaint: HIP/GROIN PAIN    
Time Seen by Provider: 12/29/24 10:44    
Source: patient    
Mode of arrival: Wheelchair    
History of Present Illness    
HPI Narrative:     
Here complaining of pain in her left hip.  She states that she has never had   
problems with her hip previously.  She states the other day she just routinely   
getting out of bed and felt a popping sensation.  She has been able to walk on   
it but it has been progressively getting more and more painful.  The pain is in   
her left groin area.  She does not have any radiculopathy type symptoms and does  
not have any pain in the low back area.  She has no history of kidney stones.    
She has no problems voiding.  She has not seen any blood in her urine.  She has   
no loss of sensation in her lower leg.  The pain is made worse when she flexes   
and extends the hip.  She has no symptoms on the right side.  She has not taken   
any anti-inflammatories or over-the-counter medication because her neurologist   
told her that she would have a rebound migraine if she took any medicines of   
that nature.    
Related Data    
                                Home Medications    
    
    
    
?Medication ?Instructions ?Recorded ?Confirmed    
     
aripiprazole 5 mg tablet 5 mg PO DAILY 12/29/24 12/29/24    
     
colestipol 1 gram tablet 1 g PO DAILY 12/29/24 12/29/24    
     
dicyclomine 10 mg capsule 10 mg PO DAILY 12/29/24 12/29/24    
     
hydroxyzine pamoate 50 mg capsule 50 mg PO DAILY 12/29/24 12/29/24    
     
lamotrigine 25 mg tablet 25 mg PO DAILY 12/29/24 12/29/24    
     
sertraline 50 mg tablet 50 mg PO DAILY 12/29/24 12/29/24    
     
trazodone 50 mg tablet 50 mg PO DAILY 12/29/24 12/29/24    
     
triamcinolone acetonide 0.1 % 1 applic topical DAILY 12/29/24 12/29/24    
    
topical cream       
     
valacyclovir 500 mg tablet 500 mg PO DAILY 12/29/24 12/29/24    
    
    
    
                                    Allergies    
    
    
    
Allergy/AdvReac Type Severity Reaction Status Date / Time    
     
amoxicillin (From Augmentin) Allergy Severe Nausea Verified 12/29/24 10:27    
     
clavulanic acid (From Allergy Severe Nausea Verified 12/29/24 10:27    
    
Augmentin)         
     
sumatriptan (From Imitrex) Allergy Severe Nausea Verified 12/29/24 10:27    
    
    
    
    
Opioid HPI    
Opioid Management    
Most Recent Pain and Opioid Data:     
    
    
                                        No Data to Display    
    
    
    
PFSH    
PFSH    
Social History    
Little interest or pleasure in doing things:  not at all     
Feeling down, depressed, or hopeless:  not at all     
    
    
    
Exam    
Narrative    
Exam Narrative:     
Awake alert [place] x 3.  She is sitting with her left hip in a moderately flexed  
position.    
    
Her pulses to the distal extremity are normal and there is no evidence of edema   
cellulitis or ropiness in the leg.  She has no pain in the ankle or the knee   
joint.  She does have some discomfort when flexing the left groin.  She does not  
have any pain in the back area.  There is no skin lesions noted in the affected   
area.  Peripheral perfusion is fine to that leg.    
Constitutional    
Vital Signs, click to edit/add:     
    
                                Last Vital Signs    
    
    
    
Temp  98.5 F   12/29/24 10:22    
     
Pulse  83   12/29/24 10:22    
     
Resp  18   12/29/24 10:22    
     
BP  141/93 H  12/29/24 10:22    
     
Pulse Ox  99   12/29/24 10:22    
     
O2 Del Method  Room Air  12/29/24 10:22    
    
    
    
    
    
Course    
Vital Signs    
Vital signs:     
    
                                   Vital Signs    
    
    
    
Temperature  98.5 F   12/29/24 10:22    
     
Pulse Rate  83   12/29/24 10:22    
     
Respiratory Rate  18   12/29/24 10:22    
     
Blood Pressure  141/93 H  12/29/24 10:22    
     
Pulse Oximetry  99   12/29/24 10:22    
     
Oxygen Delivery Method  Room Air  12/29/24 10:22    
    
    
                                            
    
    
    
Temperature  98.5 F   12/29/24 10:22    
     
Pulse Rate  83   12/29/24 10:22    
     
Respiratory Rate  18   12/29/24 10:22    
     
Blood Pressure  141/93 H  12/29/24 10:22    
     
Pulse Oximetry  99   12/29/24 10:22    
     
Oxygen Delivery Method  Room Air  12/29/24 10:22    
    
    
    
    
    
MDM - Extremity Injury (Lower)    
MDM Narrative    
Medical decision making narrative:     
Patient here with a popping sensation several days ago with increasing pain in   
her left groin and hip area.  X-rays do not show any severe degenerative changes  
or abnormalities.  They will be reviewed by the radiologist.  She is afebrile I   
see no indication of a septic joint.  I see no indication of neurovascular   
compromise.  She says that she cannot take over-the-counter NSAIDs because of   
worsening of her migraine situation.  We discussed the pros and cons of a   
steroid pack and over-the-counter Tylenol for pain management.  She will be   
referred to orthopedics for further evaluation    
    
Discharge Plan    
Discharge    
Chief Complaint: Extremity Injury, Lower    
    
Clinical Impression:    
 Acute pain of left hip    
    
    
Patient Disposition: Home, Self-Care    
    
Time of Disposition Decision: 11:17    
    
Prescriptions / Home Meds:    
No Action    
  aripiprazole 5 mg tablet     
   5 mg PO DAILY     
  colestipol 1 gram tablet     
   1 g PO DAILY     
  dicyclomine 10 mg capsule     
   10 mg PO DAILY     
  hydroxyzine pamoate 50 mg capsule     
   50 mg PO DAILY     
  lamotrigine 25 mg tablet     
   25 mg PO DAILY     
  sertraline 50 mg tablet     
   50 mg PO DAILY     
  trazodone 50 mg tablet     
   50 mg PO DAILY     
  triamcinolone acetonide 0.1 % cream     
   1 applic TOPICAL DAILY     
  valacyclovir 500 mg tablet     
   500 mg PO DAILY     
    
Print Language: English    
    
Additional Instructions:    
Start Medrol pack.  Restrict movement for 48 hours/follow-up with orthopedics    
    
Referrals:    
DEYA BOWER [Primary Care Provider] - 1 week

## 2025-03-15 ENCOUNTER — APPOINTMENT (OUTPATIENT)
Dept: GENERAL RADIOLOGY | Age: 35
End: 2025-03-15
Payer: COMMERCIAL

## 2025-03-15 ENCOUNTER — HOSPITAL ENCOUNTER (EMERGENCY)
Age: 35
Discharge: HOME | End: 2025-03-15
Payer: COMMERCIAL

## 2025-03-15 VITALS
SYSTOLIC BLOOD PRESSURE: 127 MMHG | DIASTOLIC BLOOD PRESSURE: 87 MMHG | TEMPERATURE: 97.9 F | OXYGEN SATURATION: 98 % | BODY MASS INDEX: 30.12 KG/M2 | RESPIRATION RATE: 18 BRPM | HEIGHT: 63 IN | HEART RATE: 97 BPM | WEIGHT: 170 LBS

## 2025-03-15 DIAGNOSIS — S30.0XXA COCCYX CONTUSION, INITIAL ENCOUNTER: Primary | ICD-10-CM

## 2025-03-15 PROCEDURE — 72220 X-RAY EXAM SACRUM TAILBONE: CPT

## 2025-03-15 PROCEDURE — 6370000000 HC RX 637 (ALT 250 FOR IP): Performed by: PHYSICIAN ASSISTANT

## 2025-03-15 PROCEDURE — 96372 THER/PROPH/DIAG INJ SC/IM: CPT

## 2025-03-15 PROCEDURE — 99284 EMERGENCY DEPT VISIT MOD MDM: CPT

## 2025-03-15 PROCEDURE — 6360000002 HC RX W HCPCS: Performed by: PHYSICIAN ASSISTANT

## 2025-03-15 RX ORDER — KETOROLAC TROMETHAMINE 10 MG/1
10 TABLET, FILM COATED ORAL EVERY 6 HOURS PRN
Qty: 10 TABLET | Refills: 0 | Status: SHIPPED | OUTPATIENT
Start: 2025-03-15

## 2025-03-15 RX ORDER — TRAMADOL HYDROCHLORIDE 50 MG/1
50 TABLET ORAL EVERY 6 HOURS PRN
Qty: 12 TABLET | Refills: 0 | Status: SHIPPED | OUTPATIENT
Start: 2025-03-15 | End: 2025-03-18

## 2025-03-15 RX ORDER — KETOROLAC TROMETHAMINE 30 MG/ML
30 INJECTION, SOLUTION INTRAMUSCULAR; INTRAVENOUS ONCE
Status: COMPLETED | OUTPATIENT
Start: 2025-03-15 | End: 2025-03-15

## 2025-03-15 RX ORDER — TRAMADOL HYDROCHLORIDE 50 MG/1
50 TABLET ORAL ONCE
Status: COMPLETED | OUTPATIENT
Start: 2025-03-15 | End: 2025-03-15

## 2025-03-15 RX ADMIN — TRAMADOL HYDROCHLORIDE 50 MG: 50 TABLET ORAL at 18:57

## 2025-03-15 RX ADMIN — KETOROLAC TROMETHAMINE 30 MG: 30 INJECTION, SOLUTION INTRAMUSCULAR at 18:57

## 2025-03-15 ASSESSMENT — PAIN DESCRIPTION - LOCATION: LOCATION: COCCYX;BACK

## 2025-03-15 ASSESSMENT — ENCOUNTER SYMPTOMS
SHORTNESS OF BREATH: 0
COUGH: 0

## 2025-03-15 ASSESSMENT — PAIN DESCRIPTION - ORIENTATION: ORIENTATION: LOWER

## 2025-03-15 ASSESSMENT — LIFESTYLE VARIABLES
HOW OFTEN DO YOU HAVE A DRINK CONTAINING ALCOHOL: NEVER
HOW MANY STANDARD DRINKS CONTAINING ALCOHOL DO YOU HAVE ON A TYPICAL DAY: PATIENT DOES NOT DRINK

## 2025-03-15 ASSESSMENT — PAIN DESCRIPTION - DESCRIPTORS: DESCRIPTORS: SHARP

## 2025-03-15 ASSESSMENT — PAIN - FUNCTIONAL ASSESSMENT: PAIN_FUNCTIONAL_ASSESSMENT: 0-10

## 2025-03-15 ASSESSMENT — PAIN SCALES - GENERAL: PAINLEVEL_OUTOF10: 9

## 2025-03-15 NOTE — ED PROVIDER NOTES
[facility]  EMERGENCY DEPARTMENT ENCOUNTER      Pt Name: Juani Wren  MRN: [id number]  Birthdate 1990  Date of evaluation: 3/15/2025  Provider: Avelina Uribe PA-C    CHIEF COMPLAINT       Chief Complaint   Patient presents with    Fall     Patient arrived to ER and states fell down approximately 20 carpeted steps hitting coccyx and lower back. Patient states that while walking down the stairs her left hip gave out and she fell after hip gave out. Patient has been going to PT for pain management for hip and neck pain.          HISTORY OF PRESENT ILLNESS      Juani Wren is a 34 y.o. female who presents to the emergency department due to fall on buttocks.  Prior to arrival to the emergency department the patient slipped and fell down 20 carpeted steps.  She hit her coccyx and low back on each step.  Prior to falling she had been having some left hip and low back pain anyways.  She has been going to physical therapy and pain management for hip and neck pain.  No bladder or bowel incontinence.  No numbness or tingling.  No loss of consciousness.        REVIEW OF SYSTEMS       Review of Systems   Constitutional:  Negative for fever.   Respiratory:  Negative for cough and shortness of breath.    Cardiovascular:  Negative for chest pain.         PAST MEDICAL HISTORY     Past Medical History:   Diagnosis Date    Fibromyalgia     Headache          SURGICAL HISTORY       History reviewed. No pertinent surgical history.      CURRENT MEDICATIONS       Discharge Medication List as of 3/15/2025  7:42 PM        CONTINUE these medications which have NOT CHANGED    Details   ondansetron (ZOFRAN) 4 MG tablet Take 1 tablet by mouth every 8 hours as needed for Nausea, Disp-20 tablet, R-0Normal      famotidine (PEPCID) 20 MG tablet Take 1 tablet by mouth daily for 14 days, Disp-14 tablet, R-0Normal      Rimegepant Sulfate (NURTEC) 75 MG TBDP Take 75 mg by mouth as neededHistorical Med      Prenatal Vit-DSS-Fe

## 2025-05-08 ENCOUNTER — APPOINTMENT (OUTPATIENT)
Dept: GENERAL RADIOLOGY | Age: 35
End: 2025-05-08
Payer: COMMERCIAL

## 2025-05-08 ENCOUNTER — HOSPITAL ENCOUNTER (EMERGENCY)
Age: 35
Discharge: HOME | End: 2025-05-08
Payer: COMMERCIAL

## 2025-05-08 VITALS
RESPIRATION RATE: 18 BRPM | SYSTOLIC BLOOD PRESSURE: 147 MMHG | HEART RATE: 85 BPM | DIASTOLIC BLOOD PRESSURE: 97 MMHG | TEMPERATURE: 98.7 F | OXYGEN SATURATION: 99 %

## 2025-05-08 DIAGNOSIS — R07.9 CHEST PAIN, UNSPECIFIED TYPE: Primary | ICD-10-CM

## 2025-05-08 DIAGNOSIS — R00.2 PALPITATIONS: ICD-10-CM

## 2025-05-08 LAB
ANION GAP SERPL CALCULATED.3IONS-SCNC: 10 MMOL/L (ref 9–16)
BASOPHILS # BLD: 0.04 K/UL (ref 0–0.2)
BASOPHILS NFR BLD: 1 % (ref 0–2)
BNP SERPL-MCNC: <36 PG/ML (ref 0–125)
BUN SERPL-MCNC: 11 MG/DL (ref 6–20)
BUN/CREAT SERPL: 14 (ref 9–20)
CALCIUM SERPL-MCNC: 9 MG/DL (ref 8.6–10.4)
CHLORIDE SERPL-SCNC: 105 MMOL/L (ref 98–107)
CO2 SERPL-SCNC: 25 MMOL/L (ref 20–31)
CREAT SERPL-MCNC: 0.8 MG/DL (ref 0.5–0.9)
D DIMER PPP FEU-MCNC: <0.27 UG/ML FEU (ref 0–0.59)
EOSINOPHIL # BLD: 0.06 K/UL (ref 0–0.44)
EOSINOPHILS RELATIVE PERCENT: 1 % (ref 1–4)
ERYTHROCYTE [DISTWIDTH] IN BLOOD BY AUTOMATED COUNT: 12.1 % (ref 11.8–14.4)
GFR, ESTIMATED: >90 ML/MIN/1.73M2
GLUCOSE SERPL-MCNC: 78 MG/DL (ref 74–99)
HCG SERPL QL: NEGATIVE
HCT VFR BLD AUTO: 40.2 % (ref 36.3–47.1)
HGB BLD-MCNC: 14.5 G/DL (ref 11.9–15.1)
IMM GRANULOCYTES # BLD AUTO: <0.03 K/UL (ref 0–0.3)
IMM GRANULOCYTES NFR BLD: 0 %
LYMPHOCYTES NFR BLD: 1.81 K/UL (ref 1.1–3.7)
LYMPHOCYTES RELATIVE PERCENT: 30 % (ref 24–43)
MAGNESIUM SERPL-MCNC: 2 MG/DL (ref 1.6–2.6)
MCH RBC QN AUTO: 31.7 PG (ref 25.2–33.5)
MCHC RBC AUTO-ENTMCNC: 36.1 G/DL (ref 28.4–34.8)
MCV RBC AUTO: 88 FL (ref 82.6–102.9)
MONOCYTES NFR BLD: 0.55 K/UL (ref 0.1–1.2)
MONOCYTES NFR BLD: 9 % (ref 3–12)
NEUTROPHILS NFR BLD: 59 % (ref 36–65)
NEUTS SEG NFR BLD: 3.49 K/UL (ref 1.5–8.1)
NRBC BLD-RTO: 0 PER 100 WBC
PLATELET # BLD AUTO: 200 K/UL (ref 138–453)
PMV BLD AUTO: 10.3 FL (ref 8.1–13.5)
POTASSIUM SERPL-SCNC: 4 MMOL/L (ref 3.7–5.3)
RBC # BLD AUTO: 4.57 M/UL (ref 3.95–5.11)
SODIUM SERPL-SCNC: 140 MMOL/L (ref 136–145)
TROPONIN I SERPL HS-MCNC: <6 NG/L (ref 0–14)
WBC OTHER # BLD: 6 K/UL (ref 3.5–11.3)

## 2025-05-08 PROCEDURE — 85379 FIBRIN DEGRADATION QUANT: CPT

## 2025-05-08 PROCEDURE — 83735 ASSAY OF MAGNESIUM: CPT

## 2025-05-08 PROCEDURE — 84484 ASSAY OF TROPONIN QUANT: CPT

## 2025-05-08 PROCEDURE — 93005 ELECTROCARDIOGRAM TRACING: CPT

## 2025-05-08 PROCEDURE — 84703 CHORIONIC GONADOTROPIN ASSAY: CPT

## 2025-05-08 PROCEDURE — 83880 ASSAY OF NATRIURETIC PEPTIDE: CPT

## 2025-05-08 PROCEDURE — 85025 COMPLETE CBC W/AUTO DIFF WBC: CPT

## 2025-05-08 PROCEDURE — 99285 EMERGENCY DEPT VISIT HI MDM: CPT

## 2025-05-08 PROCEDURE — 80048 BASIC METABOLIC PNL TOTAL CA: CPT

## 2025-05-08 PROCEDURE — 71045 X-RAY EXAM CHEST 1 VIEW: CPT

## 2025-05-08 ASSESSMENT — PAIN SCALES - GENERAL: PAINLEVEL_OUTOF10: 5

## 2025-05-08 ASSESSMENT — PAIN - FUNCTIONAL ASSESSMENT: PAIN_FUNCTIONAL_ASSESSMENT: 0-10

## 2025-05-08 ASSESSMENT — PAIN DESCRIPTION - LOCATION: LOCATION: CHEST

## 2025-05-08 NOTE — DISCHARGE INSTRUCTIONS
Take your medication as indicated.  For pain use ibuprofen (Motrin / Advil) or acetaminophen (Tylenol), unless prescribed medications that have acetaminophen in it.      If you have not had a stress test in over a year your primary care physician may order this test as further work-up for your chest pain.  If you have a cardiologist, then you should also call them to discuss further treatment options.    PLEASE RETURN TO THE EMERGENCY DEPARTMENT IMMEDIATELY for worsening symptoms of increasing pain, shortness of breath, feeling of your heart fluttering or racing, swelling to your feet, unable to lay flat, or if you develop any concerning symptoms such as: high fever not relieved by acetaminophen (Tylenol) and/or ibuprofen (Motrin / Advil), chills, persistent nausea and/or vomiting, loss of consciousness, numbness, weakness or tingling in the arms or legs or change in color of the extremities, changes in mental status, persistent headache, blurry vision, loss of bladder / bowel control, unable to follow up with your physician, or other any other care or concern.

## 2025-05-09 LAB
EKG ATRIAL RATE: 95 BPM
EKG P AXIS: 50 DEGREES
EKG P-R INTERVAL: 132 MS
EKG Q-T INTERVAL: 354 MS
EKG QRS DURATION: 74 MS
EKG QTC CALCULATION (BAZETT): 444 MS
EKG R AXIS: 44 DEGREES
EKG T AXIS: 46 DEGREES
EKG VENTRICULAR RATE: 95 BPM

## 2025-05-09 PROCEDURE — 93010 ELECTROCARDIOGRAM REPORT: CPT | Performed by: INTERNAL MEDICINE

## 2025-05-10 NOTE — ED PROVIDER NOTES
05/08/25   Basic Metabolic Panel   Result Value Ref Range    Sodium 140 136 - 145 mmol/L    Potassium 4.0 3.7 - 5.3 mmol/L    Chloride 105 98 - 107 mmol/L    CO2 25 20 - 31 mmol/L    Anion Gap 10 9 - 16 mmol/L    Glucose 78 74 - 99 mg/dL    BUN 11 6 - 20 mg/dL    Creatinine 0.8 0.50 - 0.90 mg/dL    Est, Glom Filt Rate >90 >60 mL/min/1.73m2    BUN/Creatinine Ratio 14 9 - 20    Calcium 9.0 8.6 - 10.4 mg/dL   CBC with Auto Differential   Result Value Ref Range    WBC 6.0 3.5 - 11.3 k/uL    RBC 4.57 3.95 - 5.11 m/uL    Hemoglobin 14.5 11.9 - 15.1 g/dL    Hematocrit 40.2 36.3 - 47.1 %    MCV 88.0 82.6 - 102.9 fL    MCH 31.7 25.2 - 33.5 pg    MCHC 36.1 (H) 28.4 - 34.8 g/dL    RDW 12.1 11.8 - 14.4 %    Platelets 200 138 - 453 k/uL    MPV 10.3 8.1 - 13.5 fL    NRBC Automated 0.0 0.0 per 100 WBC    Neutrophils % 59 36 - 65 %    Lymphocytes % 30 24 - 43 %    Monocytes % 9 3 - 12 %    Eosinophils % 1 1 - 4 %    Basophils % 1 0 - 2 %    Immature Granulocytes % 0 0 %    Neutrophils Absolute 3.49 1.50 - 8.10 k/uL    Lymphocytes Absolute 1.81 1.10 - 3.70 k/uL    Monocytes Absolute 0.55 0.10 - 1.20 k/uL    Eosinophils Absolute 0.06 0.00 - 0.44 k/uL    Basophils Absolute 0.04 0.00 - 0.20 k/uL    Immature Granulocytes Absolute <0.03 0.00 - 0.30 k/uL   D-Dimer, Quantitative   Result Value Ref Range    D-Dimer, Quant <0.27 0.00 - 0.59 ug/mL FEU   Troponin   Result Value Ref Range    Troponin, High Sensitivity <6 0 - 14 ng/L   Magnesium   Result Value Ref Range    Magnesium 2.0 1.6 - 2.6 mg/dL   HCG Qualitative, Serum   Result Value Ref Range    Preg, Serum NEGATIVE NEGATIVE   Brain Natriuretic Peptide   Result Value Ref Range    NT Pro-BNP <36 0 - 125 pg/mL   EKG 12 Lead   Result Value Ref Range    Ventricular Rate 95 BPM    Atrial Rate 95 BPM    P-R Interval 132 ms    QRS Duration 74 ms    Q-T Interval 354 ms    QTc Calculation (Bazett) 444 ms    P Axis 50 degrees    R Axis 44 degrees    T Axis 46 degrees       RADIOLOGY:   Non-plain